# Patient Record
Sex: FEMALE | Race: BLACK OR AFRICAN AMERICAN | NOT HISPANIC OR LATINO | Employment: FULL TIME | URBAN - METROPOLITAN AREA
[De-identification: names, ages, dates, MRNs, and addresses within clinical notes are randomized per-mention and may not be internally consistent; named-entity substitution may affect disease eponyms.]

---

## 2022-12-14 ENCOUNTER — OFFICE VISIT (OUTPATIENT)
Dept: DERMATOLOGY | Facility: CLINIC | Age: 27
End: 2022-12-14
Payer: COMMERCIAL

## 2022-12-14 DIAGNOSIS — L21.9 SEBORRHEIC DERMATITIS: Primary | ICD-10-CM

## 2022-12-14 PROCEDURE — 99999 PR PBB SHADOW E&M-NEW PATIENT-LVL III: CPT | Mod: PBBFAC,,, | Performed by: DERMATOLOGY

## 2022-12-14 PROCEDURE — 99204 OFFICE O/P NEW MOD 45 MIN: CPT | Mod: S$GLB,,, | Performed by: DERMATOLOGY

## 2022-12-14 PROCEDURE — 99204 PR OFFICE/OUTPT VISIT, NEW, LEVL IV, 45-59 MIN: ICD-10-PCS | Mod: S$GLB,,, | Performed by: DERMATOLOGY

## 2022-12-14 PROCEDURE — 99999 PR PBB SHADOW E&M-NEW PATIENT-LVL III: ICD-10-PCS | Mod: PBBFAC,,, | Performed by: DERMATOLOGY

## 2022-12-14 PROCEDURE — 1160F PR REVIEW ALL MEDS BY PRESCRIBER/CLIN PHARMACIST DOCUMENTED: ICD-10-PCS | Mod: CPTII,S$GLB,, | Performed by: DERMATOLOGY

## 2022-12-14 PROCEDURE — 1160F RVW MEDS BY RX/DR IN RCRD: CPT | Mod: CPTII,S$GLB,, | Performed by: DERMATOLOGY

## 2022-12-14 PROCEDURE — 1159F PR MEDICATION LIST DOCUMENTED IN MEDICAL RECORD: ICD-10-PCS | Mod: CPTII,S$GLB,, | Performed by: DERMATOLOGY

## 2022-12-14 PROCEDURE — 1159F MED LIST DOCD IN RCRD: CPT | Mod: CPTII,S$GLB,, | Performed by: DERMATOLOGY

## 2022-12-14 RX ORDER — KETOCONAZOLE 20 MG/G
CREAM TOPICAL
Qty: 60 G | Refills: 3 | Status: SHIPPED | OUTPATIENT
Start: 2022-12-14

## 2022-12-14 RX ORDER — FLUOCINOLONE ACETONIDE 0.11 MG/ML
OIL TOPICAL
Qty: 118 ML | Refills: 5 | Status: SHIPPED | OUTPATIENT
Start: 2022-12-14 | End: 2023-11-08 | Stop reason: SDUPTHER

## 2022-12-14 RX ORDER — METFORMIN HYDROCHLORIDE 500 MG/1
500 TABLET ORAL
COMMUNITY

## 2022-12-14 RX ORDER — TRIAMCINOLONE ACETONIDE 0.25 MG/G
CREAM TOPICAL 2 TIMES DAILY PRN
Qty: 80 G | Refills: 1 | Status: SHIPPED | OUTPATIENT
Start: 2022-12-14

## 2022-12-14 RX ORDER — KETOCONAZOLE 20 MG/ML
SHAMPOO, SUSPENSION TOPICAL
Qty: 120 ML | Refills: 5 | Status: SHIPPED | OUTPATIENT
Start: 2022-12-14 | End: 2023-11-08 | Stop reason: SDUPTHER

## 2022-12-14 NOTE — PROGRESS NOTES
Subjective:       Patient ID:  Main Aguilar is a 27 y.o. female who presents for   Chief Complaint   Patient presents with    Dermatitis     History of Present Illness: The patient presents with chief complaint of dermatitis.  Location: scalp  Duration: 1 year  Signs/Symptoms: irritation, flaking, scabs, burning    Prior treatments: n/a     Review of Systems   Constitutional:  Negative for fever and chills.   Gastrointestinal:  Negative for nausea and vomiting.   Skin:  Positive for activity-related sunscreen use. Negative for daily sunscreen use and recent sunburn.   Hematologic/Lymphatic: Does not bruise/bleed easily.      Objective:    Physical Exam   Constitutional: She appears well-developed and well-nourished. No distress.   Neurological: She is alert and oriented to person, place, and time. She is not disoriented.   Psychiatric: She has a normal mood and affect.   Skin:   Areas Examined (abnormalities noted in diagram):   Scalp / Hair Palpated and Inspected  Head / Face Inspection Performed  Neck Inspection Performed  RUE Inspected  LUE Inspection Performed  Nails and Digits Inspection Performed            Assessment / Plan:        Seborrheic dermatitis  -     ketoconazole (NIZORAL) 2 % shampoo; Wash hair with medicated shampoo at least 1x/week - let sit on scalp at least 5 minutes prior to rinsing  Dispense: 120 mL; Refill: 5  -     fluocinolone (DERMA-SMOOTHE) 0.01 % external oil; Apply oil to scalp once to twice a day as needed for dryness  Dispense: 118 mL; Refill: 5  -     ketoconazole (NIZORAL) 2 % cream; AAA bid  Dispense: 60 g; Refill: 3  -     triamcinolone acetonide 0.025% (KENALOG) 0.025 % cream; Apply topically 2 (two) times daily as needed. Mild steroid. Use sparingly for 1-2 weeks if needed then stop.  Dispense: 80 g; Refill: 1  -     Discussed diagnosis. Will start ketoconazole cream BID for maintenance with TAC 0.025% cream BID prn flares for seb derm of face, will add ketoconazole  shampoo 1 times/week, fluocinolone oil once daily for seb derm of scalp.             Follow up in about 3 months (around 3/14/2023).

## 2022-12-14 NOTE — PATIENT INSTRUCTIONS
Instructions for seborrheic dermatitis:    Use ketoconazole cream twice a day every day to the affected areas for maintenance treatment  For flares (scaliness, redness, itching), use triamcinolone 0.025% cream (steroid cream) twice a day to the affected areas. Mix ketoconazole cream with triamcinolone 0.025% cream and use twice a day as needed for flares only.  Triamcinolone 0.025% cream is a mild steroid and should not be used for periods longer than 2-4 weeks at a time.  Use ketoconazole shampoo 1 times/week on scalp, lather, let sit 5-10 minutes, then rinse.   Use fluocinolone scalp oil 1-2 times/day as a scalp moisturizer.  Can wear overnight with a shower cap and rinse with ketoconazole shampoo in the morning OR apply to damp scalp after shampooing hair with ketoconazole shampoo as a more intense treatment once a week. Do not use on face.

## 2023-08-08 ENCOUNTER — OFFICE VISIT (OUTPATIENT)
Dept: INTERNAL MEDICINE | Facility: CLINIC | Age: 28
End: 2023-08-08
Payer: COMMERCIAL

## 2023-08-08 VITALS
DIASTOLIC BLOOD PRESSURE: 78 MMHG | BODY MASS INDEX: 40.96 KG/M2 | HEART RATE: 79 BPM | SYSTOLIC BLOOD PRESSURE: 114 MMHG | HEIGHT: 61 IN | TEMPERATURE: 97 F | OXYGEN SATURATION: 99 % | WEIGHT: 216.94 LBS

## 2023-08-08 DIAGNOSIS — K59.09 CHRONIC CONSTIPATION: ICD-10-CM

## 2023-08-08 DIAGNOSIS — G89.29 CHRONIC PAIN OF RIGHT WRIST: ICD-10-CM

## 2023-08-08 DIAGNOSIS — M54.89 INTERSCAPULAR PAIN: Primary | ICD-10-CM

## 2023-08-08 DIAGNOSIS — M25.531 CHRONIC PAIN OF RIGHT WRIST: ICD-10-CM

## 2023-08-08 PROBLEM — L30.1 DYSHIDROTIC ECZEMA: Status: ACTIVE | Noted: 2022-11-01

## 2023-08-08 PROCEDURE — 3008F BODY MASS INDEX DOCD: CPT | Mod: CPTII,S$GLB,, | Performed by: FAMILY MEDICINE

## 2023-08-08 PROCEDURE — 1160F RVW MEDS BY RX/DR IN RCRD: CPT | Mod: CPTII,S$GLB,, | Performed by: FAMILY MEDICINE

## 2023-08-08 PROCEDURE — 99999 PR PBB SHADOW E&M-EST. PATIENT-LVL V: CPT | Mod: PBBFAC,,,

## 2023-08-08 PROCEDURE — 1160F PR REVIEW ALL MEDS BY PRESCRIBER/CLIN PHARMACIST DOCUMENTED: ICD-10-PCS | Mod: CPTII,S$GLB,, | Performed by: FAMILY MEDICINE

## 2023-08-08 PROCEDURE — 3078F DIAST BP <80 MM HG: CPT | Mod: CPTII,S$GLB,, | Performed by: FAMILY MEDICINE

## 2023-08-08 PROCEDURE — 3074F PR MOST RECENT SYSTOLIC BLOOD PRESSURE < 130 MM HG: ICD-10-PCS | Mod: CPTII,S$GLB,, | Performed by: FAMILY MEDICINE

## 2023-08-08 PROCEDURE — 99204 PR OFFICE/OUTPT VISIT, NEW, LEVL IV, 45-59 MIN: ICD-10-PCS | Mod: S$GLB,,, | Performed by: FAMILY MEDICINE

## 2023-08-08 PROCEDURE — 3074F SYST BP LT 130 MM HG: CPT | Mod: CPTII,S$GLB,, | Performed by: FAMILY MEDICINE

## 2023-08-08 PROCEDURE — 1159F MED LIST DOCD IN RCRD: CPT | Mod: CPTII,S$GLB,, | Performed by: FAMILY MEDICINE

## 2023-08-08 PROCEDURE — 99204 OFFICE O/P NEW MOD 45 MIN: CPT | Mod: S$GLB,,, | Performed by: FAMILY MEDICINE

## 2023-08-08 PROCEDURE — 3078F PR MOST RECENT DIASTOLIC BLOOD PRESSURE < 80 MM HG: ICD-10-PCS | Mod: CPTII,S$GLB,, | Performed by: FAMILY MEDICINE

## 2023-08-08 PROCEDURE — 99999 PR PBB SHADOW E&M-EST. PATIENT-LVL V: ICD-10-PCS | Mod: PBBFAC,,,

## 2023-08-08 PROCEDURE — 3008F PR BODY MASS INDEX (BMI) DOCUMENTED: ICD-10-PCS | Mod: CPTII,S$GLB,, | Performed by: FAMILY MEDICINE

## 2023-08-08 PROCEDURE — 1159F PR MEDICATION LIST DOCUMENTED IN MEDICAL RECORD: ICD-10-PCS | Mod: CPTII,S$GLB,, | Performed by: FAMILY MEDICINE

## 2023-08-08 RX ORDER — NABUMETONE 500 MG/1
500 TABLET, FILM COATED ORAL 2 TIMES DAILY PRN
COMMUNITY
Start: 2023-03-30 | End: 2023-08-08 | Stop reason: SDUPTHER

## 2023-08-08 RX ORDER — CYCLOBENZAPRINE HCL 10 MG
10 TABLET ORAL EVERY 8 HOURS PRN
COMMUNITY
Start: 2023-03-30 | End: 2023-11-08

## 2023-08-08 RX ORDER — NABUMETONE 500 MG/1
500 TABLET, FILM COATED ORAL 2 TIMES DAILY PRN
Qty: 30 TABLET | Refills: 0 | Status: SHIPPED | OUTPATIENT
Start: 2023-08-08 | End: 2023-11-08

## 2023-08-08 NOTE — ASSESSMENT & PLAN NOTE
I believe this is a tendinitis  Two ibuprofen 4 times a day for 10 days  Ice 3 or 4 times a day  Wear elastic knee brace  Rest any further weaken and gradually start increasing exercise  If no improvement in a week or 2 consider outpatient physical therapy  Note, the tibia has some cyst in the shaft  You need to consult with an orthopedic surgeon about the cystic area in the tibia  Will refer to gi for evaluation

## 2023-08-08 NOTE — PROGRESS NOTES
Subjective     Patient ID: Main Aguilar is a 27 y.o. female.    Chief Complaint: Establish Care and Back Pain    HPI  Patient presents today to establish care with c/o upper back pain and right wrist pain. Patient reports injury to upper back in March after using the rowing machine. Patient was treated with nsaid and muscle relaxant and symptoms improved.  Patient reports sharp pain spontaneously returning on the medial aspect of right shoulder blade without injury. Patient states right wrist pain started with over extension of wrist.  Now having pain sporadically along with numbness in knuckle area which is worse at night. 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 Does report taking 5 to 15 min breaks every hour to stand or walk including a lunch break.  Patient is also concerned about her constipation. Has always been issue. Saw some improvement when started on metformin but is getting worse.    Review of Systems   Constitutional: Negative.    HENT: Negative.     Eyes:  Negative for discharge and redness.   Respiratory: Negative.     Cardiovascular: Negative.  Negative for chest pain, palpitations and leg swelling.   Gastrointestinal:  Positive for constipation. Negative for diarrhea.   Musculoskeletal:  Positive for arthralgias and back pain. Negative for gait problem.   Neurological: Negative.  Negative for coordination difficulties and coordination difficulties.   Psychiatric/Behavioral: Negative.            Objective     Physical Exam  Vitals and nursing note reviewed.   Constitutional:       Appearance: Normal appearance. She is normal weight.   HENT:      Head: Normocephalic and atraumatic.      Nose: Nose normal.      Mouth/Throat:      Mouth: Mucous membranes are moist.      Pharynx: Oropharynx is clear.   Eyes:      Extraocular Movements: Extraocular movements intact.      Conjunctiva/sclera: Conjunctivae normal.      Pupils: Pupils are equal, round, and reactive to light.    Cardiovascular:      Rate and Rhythm: Normal rate and regular rhythm.      Pulses: Normal pulses.      Heart sounds: Normal heart sounds.   Pulmonary:      Effort: Pulmonary effort is normal.      Breath sounds: Normal breath sounds.   Abdominal:      General: Abdomen is flat. Bowel sounds are normal.      Palpations: Abdomen is soft.   Musculoskeletal:      Right wrist: No swelling or deformity. Decreased range of motion.      Cervical back: Normal range of motion and neck supple. No tenderness.      Thoracic back: No tenderness.      Lumbar back: No tenderness.        Back:       Comments: Tender spot   Skin:     General: Skin is warm and dry.   Neurological:      General: No focal deficit present.      Mental Status: She is alert and oriented to person, place, and time.   Psychiatric:         Mood and Affect: Mood normal.         Behavior: Behavior normal.          Assessment and Plan     1. Interscapular pain  Assessment & Plan:  Has failed conservative therapy will refer to ortho for further evaluation    Orders:  -     nabumetone (RELAFEN) 500 MG tablet; Take 1 tablet (500 mg total) by mouth 2 (two) times daily as needed for Pain.  Dispense: 30 tablet; Refill: 0  -     Ambulatory referral/consult to Orthopedics; Future; Expected date: 08/15/2023    2. Chronic pain of right wrist  Assessment & Plan:  Has failed conservative therapy will refer to ortho for further evaluation      Orders:  -     nabumetone (RELAFEN) 500 MG tablet; Take 1 tablet (500 mg total) by mouth 2 (two) times daily as needed for Pain.  Dispense: 30 tablet; Refill: 0  -     Ambulatory referral/consult to Orthopedics; Future; Expected date: 08/15/2023    3. Chronic constipation  Assessment & Plan:  Will refer to gi for evaluation    Orders:  -     Ambulatory referral/consult to Gastroenterology; Future; Expected date: 08/15/2023              No follow-ups on file.

## 2023-08-11 DIAGNOSIS — M25.531 RIGHT WRIST PAIN: Primary | ICD-10-CM

## 2023-08-15 ENCOUNTER — OFFICE VISIT (OUTPATIENT)
Dept: ORTHOPEDICS | Facility: CLINIC | Age: 28
End: 2023-08-15
Payer: COMMERCIAL

## 2023-08-15 ENCOUNTER — HOSPITAL ENCOUNTER (OUTPATIENT)
Dept: RADIOLOGY | Facility: HOSPITAL | Age: 28
Discharge: HOME | End: 2023-08-15
Attending: ORTHOPAEDIC SURGERY
Payer: COMMERCIAL

## 2023-08-15 VITALS — BODY MASS INDEX: 40.78 KG/M2 | HEIGHT: 61 IN | WEIGHT: 216 LBS

## 2023-08-15 DIAGNOSIS — Z01.818 PREOP TESTING: ICD-10-CM

## 2023-08-15 DIAGNOSIS — M67.431 GANGLION OF RIGHT WRIST: Primary | ICD-10-CM

## 2023-08-15 DIAGNOSIS — M25.531 RIGHT WRIST PAIN: ICD-10-CM

## 2023-08-15 DIAGNOSIS — M54.89 INTERSCAPULAR PAIN: ICD-10-CM

## 2023-08-15 PROCEDURE — 99204 OFFICE O/P NEW MOD 45 MIN: CPT | Mod: S$GLB,,, | Performed by: ORTHOPAEDIC SURGERY

## 2023-08-15 PROCEDURE — 99999 PR PBB SHADOW E&M-EST. PATIENT-LVL III: ICD-10-PCS | Mod: PBBFAC,,, | Performed by: ORTHOPAEDIC SURGERY

## 2023-08-15 PROCEDURE — 73110 X-RAY EXAM OF WRIST: CPT | Mod: TC,RT

## 2023-08-15 PROCEDURE — 99999 PR PBB SHADOW E&M-EST. PATIENT-LVL III: CPT | Mod: PBBFAC,,, | Performed by: ORTHOPAEDIC SURGERY

## 2023-08-15 PROCEDURE — 1160F PR REVIEW ALL MEDS BY PRESCRIBER/CLIN PHARMACIST DOCUMENTED: ICD-10-PCS | Mod: CPTII,S$GLB,, | Performed by: ORTHOPAEDIC SURGERY

## 2023-08-15 PROCEDURE — 1160F RVW MEDS BY RX/DR IN RCRD: CPT | Mod: CPTII,S$GLB,, | Performed by: ORTHOPAEDIC SURGERY

## 2023-08-15 PROCEDURE — 1159F PR MEDICATION LIST DOCUMENTED IN MEDICAL RECORD: ICD-10-PCS | Mod: CPTII,S$GLB,, | Performed by: ORTHOPAEDIC SURGERY

## 2023-08-15 PROCEDURE — 3008F BODY MASS INDEX DOCD: CPT | Mod: CPTII,S$GLB,, | Performed by: ORTHOPAEDIC SURGERY

## 2023-08-15 PROCEDURE — 99204 PR OFFICE/OUTPT VISIT, NEW, LEVL IV, 45-59 MIN: ICD-10-PCS | Mod: S$GLB,,, | Performed by: ORTHOPAEDIC SURGERY

## 2023-08-15 PROCEDURE — 1159F MED LIST DOCD IN RCRD: CPT | Mod: CPTII,S$GLB,, | Performed by: ORTHOPAEDIC SURGERY

## 2023-08-15 PROCEDURE — 3008F PR BODY MASS INDEX (BMI) DOCUMENTED: ICD-10-PCS | Mod: CPTII,S$GLB,, | Performed by: ORTHOPAEDIC SURGERY

## 2023-08-15 PROCEDURE — 73110 X-RAY EXAM OF WRIST: CPT | Mod: 26,RT,, | Performed by: RADIOLOGY

## 2023-08-15 PROCEDURE — 73110 XR WRIST COMPLETE 3 VIEWS RIGHT: ICD-10-PCS | Mod: 26,RT,, | Performed by: RADIOLOGY

## 2023-08-15 NOTE — H&P (VIEW-ONLY)
Subjective:     Patient ID: Main Aguilar is a 28 y.o. female.    Chief Complaint: Pain and Swelling of the Right Wrist      HPI:  The patient is a 28-year-old female with a right wrist dorsal ganglion cyst that she is noticed over the last several weeks.  She has pain at the extremes of extension.  The mass waxes and wanes in size.  She wishes to have a right wrist dorsal ganglion cyst excision    Past Medical History:   Diagnosis Date    Nontoxic single thyroid nodule     PCOS (polycystic ovarian syndrome)     Prediabetes     Seborrheic dermatitis      No past surgical history on file.  Family History   Problem Relation Age of Onset    Cancer Mother         Leukemia    Miscarriages / Stillbirths Mother     Cancer Maternal Grandmother         Colon    Cancer Paternal Grandmother         Breast     Social History     Socioeconomic History    Marital status:    Tobacco Use    Smoking status: Never     Passive exposure: Never    Smokeless tobacco: Never   Substance and Sexual Activity    Alcohol use: Yes     Alcohol/week: 1.0 standard drink of alcohol     Types: 1 Drinks containing 0.5 oz of alcohol per week     Comment: Maybe once every 3 - 4 months    Drug use: Not Currently     Types: Marijuana     Comment: Tried it over 4 years ago    Sexual activity: Yes     Partners: Male     Birth control/protection: None     Medication List with Changes/Refills   Current Medications    CYCLOBENZAPRINE (FLEXERIL) 10 MG TABLET    Take 10 mg by mouth every 8 (eight) hours as needed.    FLUOCINOLONE (DERMA-SMOOTHE) 0.01 % EXTERNAL OIL    Apply oil to scalp once to twice a day as needed for dryness    KETOCONAZOLE (NIZORAL) 2 % CREAM    AAA bid    KETOCONAZOLE (NIZORAL) 2 % SHAMPOO    Wash hair with medicated shampoo at least 1x/week - let sit on scalp at least 5 minutes prior to rinsing    METFORMIN (GLUCOPHAGE) 500 MG TABLET    Take 500 mg by mouth 2 (two) times daily with meals.    NABUMETONE (RELAFEN) 500 MG TABLET     Take 1 tablet (500 mg total) by mouth 2 (two) times daily as needed for Pain.    TRIAMCINOLONE ACETONIDE 0.025% (KENALOG) 0.025 % CREAM    Apply topically 2 (two) times daily as needed. Mild steroid. Use sparingly for 1-2 weeks if needed then stop.     Review of patient's allergies indicates:   Allergen Reactions    Lactase Rash     Review of Systems   Constitutional: Negative for malaise/fatigue.   HENT:  Negative for hearing loss.    Eyes:  Negative for double vision and visual disturbance.   Cardiovascular:  Negative for chest pain.   Respiratory:  Negative for shortness of breath.    Endocrine: Negative for cold intolerance.   Hematologic/Lymphatic: Does not bruise/bleed easily.   Skin:  Negative for poor wound healing and suspicious lesions.   Musculoskeletal:  Negative for gout, joint pain and joint swelling.   Gastrointestinal:  Positive for constipation. Negative for nausea and vomiting.   Genitourinary:  Negative for dysuria.   Neurological:  Negative for numbness, paresthesias and sensory change.   Psychiatric/Behavioral:  Negative for depression, memory loss and substance abuse. The patient is not nervous/anxious.    Allergic/Immunologic: Negative for persistent infections.       Objective:   Body mass index is 40.81 kg/m².  There were no vitals filed for this visit.             General    Constitutional: She is oriented to person, place, and time. She appears well-developed and well-nourished. No distress.   HENT:   Head: Normocephalic.   Mouth/Throat: Oropharynx is clear and moist.   Eyes: EOM are normal.   Cardiovascular:  Normal rate.            Pulmonary/Chest: Effort normal.   Abdominal: Soft.   Neurological: She is alert and oriented to person, place, and time. No cranial nerve deficit.   Psychiatric: She has a normal mood and affect.             Right Hand/Wrist Exam     Inspection   Scars: Wrist - absent Hand -  absent  Effusion: Wrist - absent Hand -  absent    Pain   Wrist - The patient  exhibits pain of the scapholunate/lunate ECU.    Other     Neuorologic Exam    Median Distribution: normal  Ulnar Distribution: normal  Radial Distribution: normal    Comments:  The patient has a 1 cm right wrist dorsal ganglion cyst with pain at the extremes of extension.          Vascular Exam       Capillary Refill  Right Hand: normal capillary refill          Relevant imaging results reviewed and interpreted by me, discussed with the patient and / or family today radiographs right wrist were normal  Assessment:     Right wrist dorsal ganglion cyst     Plan:       The patient wishes to have excision ganglion cyst dorsal right wrist.  She was counseled regarding the surgery.  Risk complications and alternatives were discussed including the risk of infection, anesthetic risk, injury to nerves and vessels, loss of motion, and possible need for additional surgeries were discussed.  A 5% recurrence rate was quoted.  She seems to understand and agree to that surgery.  All questions were answered.              Disclaimer: This note was prepared using a voice recognition system and is likely to have sound alike errors within the text.

## 2023-08-18 DIAGNOSIS — Z01.818 PRE-OP TESTING: Primary | ICD-10-CM

## 2023-08-18 RX ORDER — PHENTERMINE HYDROCHLORIDE 37.5 MG/1
1 CAPSULE ORAL
COMMUNITY
End: 2023-08-28

## 2023-08-21 ENCOUNTER — PATIENT MESSAGE (OUTPATIENT)
Dept: RESEARCH | Facility: HOSPITAL | Age: 28
End: 2023-08-21

## 2023-08-24 PROBLEM — Z01.818 PRE-OP EXAM: Status: ACTIVE | Noted: 2023-08-24

## 2023-08-24 PROBLEM — E28.2 PCOS (POLYCYSTIC OVARIAN SYNDROME): Status: ACTIVE | Noted: 2023-08-24

## 2023-08-24 PROBLEM — M67.40 GANGLION CYST: Status: ACTIVE | Noted: 2023-08-24

## 2023-08-24 PROBLEM — R73.03 PRE-DIABETES: Status: ACTIVE | Noted: 2023-08-24

## 2023-08-24 NOTE — PROGRESS NOTES
Preoperative History and Physical  [facility]                                                                   Chief Complaint: Preoperative evaluation     History of Present Illness:      Main Aguliar is a 28 y.o. female who presents to the office today for a preoperative consultation at the request of Dr. Camarena who plans on performing right wrist ganglion cyst excision on September 11.     Functional Status:      The patient is able to climb a flight of stairs. The patient is able to ambulate  without difficulty. The patient's functional status is not affected by the surgical problem. The patient's functional status is not affected by shortness of breath, chest pain, dyspnea on exertion and fatigue.  Active gym 4 x weekly no CP.   MET score greater than 4    Patient Anesthesia History:      No prior anesthesia   Family Anesthesia History:      History of Malignant Hyperthermia: no  History of Pseudocholinesterase Deficiency: no     Past Medical History:      Past Medical History:   Diagnosis Date    Asthma     Nontoxic single thyroid nodule     PCOS (polycystic ovarian syndrome)     Prediabetes     Seborrheic dermatitis         Past Surgical History:      No past surgical history on file.     Social History:      Social History     Socioeconomic History    Marital status:    Tobacco Use    Smoking status: Never     Passive exposure: Never    Smokeless tobacco: Never   Substance and Sexual Activity    Alcohol use: Yes     Alcohol/week: 1.0 standard drink of alcohol     Types: 1 Drinks containing 0.5 oz of alcohol per week     Comment: Maybe once every 3 - 4 months    Drug use: Not Currently     Types: Marijuana     Comment: Tried it over 4 years ago    Sexual activity: Yes     Partners: Male     Birth control/protection: None        Family History:      Family History   Problem Relation Age of Onset    Cancer Mother         Leukemia    Miscarriages  / Stillbirths Mother     Hypertension Father     Cancer Maternal Grandmother         Colon    Cancer Paternal Grandmother         Breast       Allergies:      Review of patient's allergies indicates:   Allergen Reactions    Lactase Rash       Medications:      Current Outpatient Medications   Medication Sig    fluocinolone (DERMA-SMOOTHE) 0.01 % external oil Apply oil to scalp once to twice a day as needed for dryness    ketoconazole (NIZORAL) 2 % cream AAA bid    ketoconazole (NIZORAL) 2 % shampoo Wash hair with medicated shampoo at least 1x/week - let sit on scalp at least 5 minutes prior to rinsing    metFORMIN (GLUCOPHAGE) 500 MG tablet Take 500 mg by mouth 2 (two) times daily with meals.    nabumetone (RELAFEN) 500 MG tablet Take 1 tablet (500 mg total) by mouth 2 (two) times daily as needed for Pain.    triamcinolone acetonide 0.025% (KENALOG) 0.025 % cream Apply topically 2 (two) times daily as needed. Mild steroid. Use sparingly for 1-2 weeks if needed then stop.    cyclobenzaprine (FLEXERIL) 10 MG tablet Take 10 mg by mouth every 8 (eight) hours as needed.     No current facility-administered medications for this visit.       Vitals:      Vitals:    08/28/23 0811   BP: 120/70   Pulse: 84   Resp: 14   Temp: 97.8 °F (36.6 °C)       Review of Systems:        Constitutional: Negative for fever, chills, weight loss, and diaphoresis.   HENT: Negative for hearing loss, ear pain, nosebleeds, congestion, sore throat, tinnitus and ear discharge.   neck pain  Eyes: Negative for blurred vision, double vision, photophobia, pain, discharge and redness.   Respiratory: Negative for cough, hemoptysis, sputum production, wheezing and stridor.    Cardiovascular: Negative for chest pain, palpitations, orthopnea, claudication, leg swelling and PND.   Gastrointestinal: Negative for heartburn, nausea, , abdominal pain, diarrhea, constipation, blood in stool and melena. Occ vomiting   Genitourinary: Negative for dysuria, urgency,  frequency, hematuria and flank pain.   Musculoskeletal: Negative for myalgias, , joint pain and falls. back pain- R shoulder blade   Skin: eczema L antecubital fossa , seborrhoic dermatitis    Neurological: Negative for dizziness, , tremors, sensory change, speech change, focal weakness, seizures, loss of consciousness, weakness  Tingling R hand; headaches   Endo/Heme/Allergies: Negative for environmental allergies and polydipsia  Psychiatric/Behavioral: Negative for depression.     Physical Exam:      Constitutional: Appears well-developed, well-nourished and in no acute distress.  Patient is oriented to person, place, and time.   Head: Normocephalic and atraumatic. Mucous membranes moist.  Neck: Neck supple no mass.   Cardiovascular: Normal rate and regular rhythm.  S1 S2 appreciated by ascultation.  Pulmonary/Chest: Effort normal and clear to auscultation bilaterally. No respiratory distress.   Abdomen: Soft. Non-tender and non-distended. Bowel sounds are normal.   Neurological: Patient is alert and oriented to person, place and time. Moves all extremities.  Skin: Warm and dry. No lesions.  Extremities: No clubbing, cyanosis or edema.    Laboratory data:      Reviewed and noted in plan where applicable. Please see chart for full laboratory data.    Labs pending   Predictors of intubation difficulty:       Morbid obesity? BMI 40.8   Anatomically abnormal facies? no   Prominent incisors? no   Receding mandible? no   Short, thick neck? no   Neck range of motion: normal   Dentition:  intact  Based on the Modified Mallampati, patient is a mallampati score: I (soft palate, uvula, fauces, and tonsillar pillars visible)    Cardiographics:      ECG:  none  Echocardiogram:  none    Imaging:      Chest x-ray:  none      Assessment and Plan:      Pre-op exam  Patient presents at the request of Dr. Camarena who plans on performing a ganglion cyst excision to right wrist on September 11th  - Known risk factors for perioperative  complications: pre DM, BMI 40    Difficulty with intubation is not anticipated.  No airway history available for review    Cardiac Risk Estimation:  Per Revised Cardiac Risk Index patient is a Class I  risk with a 3.9% risk of a major cardiac event.      1.) Preoperative workup as follows: hemoglobin, hematocrit, electrolytes, creatinine, glucose, liver function studies.  2.) Change in medication regimen before surgery: Hold NSAIDs 7 days preop, hold metformin 24 hours prior to surgery.  3.) Prophylaxis for cardiac events with perioperative beta-blockers: not indicated.  4.) Invasive hemodynamic monitoring perioperatively: not indicated.  5.) Deep vein thrombosis prophylaxis postoperatively: intermittent pneumatic compression boots and regimen to be chosen by surgical team.  6.) Surveillance for postoperative MI with ECG immediately postoperatively and on postoperati ve days 1 and 2 AND troponin levels 24 hours postoperatively and on day 4 or hospital discharge (whichever comes first): not indicated.  7.) Current medications which may produce withdrawal symptoms if withheld perioperatively: none  8.) Other measures: none      Ganglion cyst  Plans for surgical excision on September 11th  - R wrist pain     Pre-diabetes  A1c pending  - hold metformin 24 hours prior to surgery    BMI 40.0-44.9, adult  - patient with BMI of 40.81  - patient on phentermine needs to hold 2 weeks preoperatively      PCOS (polycystic ovarian syndrome)  - on metformin  - heavy cycles  - keep follow up with GYN     Thyroid nodule  - previously seen by endocrine   - no dysphagia  - can feel thyroid nodule  with voice projection  - per care everywhere : R nod 2.4cm  , US showed enlarged thyroid nodule to R    FNA completed 2/10/2023 - pathology benign    Repeat US in one year -2/2024.

## 2023-08-24 NOTE — ASSESSMENT & PLAN NOTE
Patient presents at the request of Dr. Camarena who plans on performing a ganglion cyst excision to right wrist on September 11th  - Known risk factors for perioperative complications: pre DM, BMI 40    Difficulty with intubation is not anticipated.  No airway history available for review    Cardiac Risk Estimation:  Per Revised Cardiac Risk Index patient is a Class I  risk with a 3.9% risk of a major cardiac event.      1.) Preoperative workup as follows: hemoglobin, hematocrit, electrolytes, creatinine, glucose, liver function studies.  2.) Change in medication regimen before surgery: Hold NSAIDs 7 days preop, hold metformin 24 hours prior to surgery.  3.) Prophylaxis for cardiac events with perioperative beta-blockers: not indicated.  4.) Invasive hemodynamic monitoring perioperatively: not indicated.  5.) Deep vein thrombosis prophylaxis postoperatively: intermittent pneumatic compression boots and regimen to be chosen by surgical team.  6.) Surveillance for postoperative MI with ECG immediately postoperatively and on postoperati ve days 1 and 2 AND troponin levels 24 hours postoperatively and on day 4 or hospital discharge (whichever comes first): not indicated.  7.) Current medications which may produce withdrawal symptoms if withheld perioperatively: none  8.) Other measures: none

## 2023-08-28 ENCOUNTER — LAB VISIT (OUTPATIENT)
Dept: LAB | Facility: HOSPITAL | Age: 28
End: 2023-08-28
Attending: ORTHOPAEDIC SURGERY
Payer: COMMERCIAL

## 2023-08-28 ENCOUNTER — PATIENT MESSAGE (OUTPATIENT)
Dept: PREADMISSION TESTING | Facility: HOSPITAL | Age: 28
End: 2023-08-28

## 2023-08-28 ENCOUNTER — OFFICE VISIT (OUTPATIENT)
Dept: INTERNAL MEDICINE | Facility: CLINIC | Age: 28
End: 2023-08-28
Payer: COMMERCIAL

## 2023-08-28 VITALS
DIASTOLIC BLOOD PRESSURE: 70 MMHG | SYSTOLIC BLOOD PRESSURE: 120 MMHG | TEMPERATURE: 98 F | RESPIRATION RATE: 14 BRPM | HEART RATE: 84 BPM

## 2023-08-28 DIAGNOSIS — Z01.818 PRE-OP EXAM: Primary | ICD-10-CM

## 2023-08-28 DIAGNOSIS — Z01.818 PREOP TESTING: ICD-10-CM

## 2023-08-28 DIAGNOSIS — Z01.818 PRE-OP TESTING: ICD-10-CM

## 2023-08-28 DIAGNOSIS — Z01.818 PRE-OP EXAM: ICD-10-CM

## 2023-08-28 DIAGNOSIS — M67.40 GANGLION CYST: ICD-10-CM

## 2023-08-28 DIAGNOSIS — E04.1 THYROID NODULE: ICD-10-CM

## 2023-08-28 DIAGNOSIS — E28.2 PCOS (POLYCYSTIC OVARIAN SYNDROME): ICD-10-CM

## 2023-08-28 DIAGNOSIS — R73.03 PRE-DIABETES: ICD-10-CM

## 2023-08-28 LAB
ALBUMIN SERPL BCP-MCNC: 3.5 G/DL (ref 3.5–5.2)
ALP SERPL-CCNC: 79 U/L (ref 55–135)
ALT SERPL W/O P-5'-P-CCNC: 12 U/L (ref 10–44)
ANION GAP SERPL CALC-SCNC: 7 MMOL/L (ref 8–16)
AST SERPL-CCNC: 18 U/L (ref 10–40)
BASOPHILS # BLD AUTO: 0.06 K/UL (ref 0–0.2)
BASOPHILS NFR BLD: 1.1 % (ref 0–1.9)
BILIRUB SERPL-MCNC: 0.4 MG/DL (ref 0.1–1)
BUN SERPL-MCNC: 9 MG/DL (ref 6–20)
CALCIUM SERPL-MCNC: 9.2 MG/DL (ref 8.7–10.5)
CHLORIDE SERPL-SCNC: 110 MMOL/L (ref 95–110)
CO2 SERPL-SCNC: 22 MMOL/L (ref 23–29)
CREAT SERPL-MCNC: 0.8 MG/DL (ref 0.5–1.4)
DIFFERENTIAL METHOD: ABNORMAL
EOSINOPHIL # BLD AUTO: 0.1 K/UL (ref 0–0.5)
EOSINOPHIL NFR BLD: 2.3 % (ref 0–8)
ERYTHROCYTE [DISTWIDTH] IN BLOOD BY AUTOMATED COUNT: 18.2 % (ref 11.5–14.5)
EST. GFR  (NO RACE VARIABLE): >60 ML/MIN/1.73 M^2
ESTIMATED AVG GLUCOSE: 108 MG/DL (ref 68–131)
GLUCOSE SERPL-MCNC: 85 MG/DL (ref 70–110)
HBA1C MFR BLD: 5.4 % (ref 4–5.6)
HCT VFR BLD AUTO: 31.1 % (ref 37–48.5)
HGB BLD-MCNC: 8.8 G/DL (ref 12–16)
IMM GRANULOCYTES # BLD AUTO: 0.01 K/UL (ref 0–0.04)
IMM GRANULOCYTES NFR BLD AUTO: 0.2 % (ref 0–0.5)
LYMPHOCYTES # BLD AUTO: 2.5 K/UL (ref 1–4.8)
LYMPHOCYTES NFR BLD: 43.6 % (ref 18–48)
MCH RBC QN AUTO: 20.5 PG (ref 27–31)
MCHC RBC AUTO-ENTMCNC: 28.3 G/DL (ref 32–36)
MCV RBC AUTO: 72 FL (ref 82–98)
MONOCYTES # BLD AUTO: 0.6 K/UL (ref 0.3–1)
MONOCYTES NFR BLD: 9.9 % (ref 4–15)
NEUTROPHILS # BLD AUTO: 2.4 K/UL (ref 1.8–7.7)
NEUTROPHILS NFR BLD: 42.9 % (ref 38–73)
NRBC BLD-RTO: 0 /100 WBC
PLATELET # BLD AUTO: 648 K/UL (ref 150–450)
PMV BLD AUTO: 9.7 FL (ref 9.2–12.9)
POTASSIUM SERPL-SCNC: 4.4 MMOL/L (ref 3.5–5.1)
PROT SERPL-MCNC: 7.3 G/DL (ref 6–8.4)
RBC # BLD AUTO: 4.3 M/UL (ref 4–5.4)
SODIUM SERPL-SCNC: 139 MMOL/L (ref 136–145)
WBC # BLD AUTO: 5.67 K/UL (ref 3.9–12.7)

## 2023-08-28 PROCEDURE — 99499 UNLISTED E&M SERVICE: CPT | Mod: S$GLB,,, | Performed by: ANESTHESIOLOGY

## 2023-08-28 PROCEDURE — 36415 COLL VENOUS BLD VENIPUNCTURE: CPT | Performed by: ORTHOPAEDIC SURGERY

## 2023-08-28 PROCEDURE — 85025 COMPLETE CBC W/AUTO DIFF WBC: CPT | Performed by: ORTHOPAEDIC SURGERY

## 2023-08-28 PROCEDURE — 80053 COMPREHEN METABOLIC PANEL: CPT | Performed by: ORTHOPAEDIC SURGERY

## 2023-08-28 PROCEDURE — 99999 PR PBB SHADOW E&M-EST. PATIENT-LVL III: CPT | Mod: PBBFAC,,,

## 2023-08-28 PROCEDURE — 99999 PR PBB SHADOW E&M-EST. PATIENT-LVL III: ICD-10-PCS | Mod: PBBFAC,,,

## 2023-08-28 PROCEDURE — 83036 HEMOGLOBIN GLYCOSYLATED A1C: CPT | Performed by: PHYSICIAN ASSISTANT

## 2023-08-28 PROCEDURE — 99499 NO LOS: ICD-10-PCS | Mod: S$GLB,,, | Performed by: ANESTHESIOLOGY

## 2023-08-28 NOTE — DISCHARGE INSTRUCTIONS
To confirm, your doctor has instructed you: Surgery is scheduled for 9/11/2023.     Pre admit office will call the afternoon prior to surgery between 1PM and 3PM with arrival time.    Surgery will be at [facility],  The address is [address]. [place] [address].      IMPORTANT INSTRUCTIONS!    Do not eat or drink after 12 midnight, including water. Do not smoke or use chewing tobacco after 12 midnight  OK to brush teeth, but no gum, candy, or mints!      *Take only these medicines with a small swallow of water-morning of surgery*     none       ____ Stop Aspirin, Ibuprofen, Motrin and Aleve at least 5-7 days before surgery, unless otherwise instructed by your doctor, or the nurse.   You MAY use Tylenol/acetaminophen until day of surgery.      ____  If you take diabetic medication, do NOT take morning of surgery unless instructed by Doctor. Metformin must be stopped 24 hrs prior to surgery time.       ____ Stop taking any Fish Oil supplements or Vitamins at least 5 days prior to surgery, unless instructed otherwise by your Doctor.       Please notify MD office if you have an active infection, currently taking antibiotics or received a vaccination within the past 7 days.    You may be required to provide a urine sample prior to procedure;   Please ask  for a specimen cup if you need to use the restroom prior to being called into pre-op.    Bathing Instructions: The night before surgery and the morning prior to coming to the hospital:    - Shower & rinse your body as usual with anti-bacterial Soap (Dial or Lever 2000)   -Hibiclens (if indicated) use AFTER anti-bacterial soap; 1 packet PM/1 packet in AM on surgical site only   -Do not use hibiclens on your head, face, or genitals.    -Do not wash with anti-bacterial soap after you use the hibiclens.    -Do not shave surgical site 5-7 days prior to surgery.    -Pubic hair 7 days prior to surgery (gyn pt's).      Pediatric patients do not  need to use anti-bacterial soap or Hibiclens.             After Bathing:   __ No powder, lotions, creams, or body spray to skin     __No deodorant for any breast procedure, PORT, or upper arm surgery     __ No makeup, mascara, nail polish or artificial nails       **SURGERY WILL BE CANCELLED IF ARTIFICIAL/NAIL POLISH IS PRESENT!!!**    __ Please remove all piercings and leave all jewelry at home.    **SURGERY WILL BE CANCELLED IF PIERCINGS ARE PRESENT!!!**      __ Dentures, Hearing Aids and Contact Lens need to be removed prior to the start of surgery.      __ Wear clean, loose-fitting clothing. Allow for dressings/bandages/surgical equipment     __ You must have transportation, and they MUST stay the entire time.         [facility] Visitor/Ride Policy:   Only 1 adult allowed (over the age of 18) to accompany you and MUST STAY through the entire length of admission.     -Must have a ride home from a responsible adult that you know and trust.    -Medical Transport, Uber or Lyft can only be used if patient has a responsible adult to accompany them during ride home.  Pediatric patients are encouraged to have 2 adults accompany them to the surgery center.     ~Your ride MUST STAY the entire time until you are discharged~        Post-Op Instructions: You will receive surgery post-op instructions by your Discharge Nurse prior to going home.     Surgical Site Infection:   Prevention of surgical site infections:   -Keep incisions clean and dry.   -Do not soak/submerge incisions in water until completely healed.   -Do not apply lotions, powders, creams, or deodorants to site.   -Always make sure hands are cleaned with antibacterial soap/ alcohol-based  prior to touching the surgical site.       Signs and symptoms:               -Redness and pain around the area where you had surgery               -Drainage of cloudy fluid from your surgical wound               -Fever over 100.4 or chills     >>>Call Surgeon  office/on-call Surgeon if you experience any of these signs & symptoms post-surgery @ [phone].       *Please Call [facility] Pre-Admit Department for surgery instruction questions:  [phone]    *Payment questions:  [phone]    *Billing questions:  [phone]

## 2023-08-28 NOTE — PRE-PROCEDURE INSTRUCTIONS
To confirm, your doctor has instructed you: Surgery is scheduled for 9/11/2023.     Pre admit office will call the afternoon prior to surgery between 1PM and 3PM with arrival time.    Surgery will be at [facility],  The address is [address]. [place] [address].      IMPORTANT INSTRUCTIONS!    Do not eat or drink after 12 midnight, including water. Do not smoke or use chewing tobacco after 12 midnight  OK to brush teeth, but no gum, candy, or mints!      *Take only these medicines with a small swallow of water-morning of surgery*     none       ____ Stop Aspirin, Ibuprofen, Motrin and Aleve at least 5-7 days before surgery, unless otherwise instructed by your doctor, or the nurse.   You MAY use Tylenol/acetaminophen until day of surgery.      ____  If you take diabetic medication, do NOT take morning of surgery unless instructed by Doctor. Metformin must be stopped 24 hrs prior to surgery time.       ____ Stop taking any Fish Oil supplements or Vitamins at least 5 days prior to surgery, unless instructed otherwise by your Doctor.       Please notify MD office if you have an active infection, currently taking antibiotics or received a vaccination within the past 7 days.    You may be required to provide a urine sample prior to procedure;   Please ask  for a specimen cup if you need to use the restroom prior to being called into pre-op.    Bathing Instructions: The night before surgery and the morning prior to coming to the hospital:    - Shower & rinse your body as usual with anti-bacterial Soap (Dial or Lever 2000)   -Hibiclens (if indicated) use AFTER anti-bacterial soap; 1 packet PM/1 packet in AM on surgical site only   -Do not use hibiclens on your head, face, or genitals.    -Do not wash with anti-bacterial soap after you use the hibiclens.    -Do not shave surgical site 5-7 days prior to surgery.    -Pubic hair 7 days prior to surgery (gyn pt's).      Pediatric patients do not  need to use anti-bacterial soap or Hibiclens.             After Bathing:   __ No powder, lotions, creams, or body spray to skin     __No deodorant for any breast procedure, PORT, or upper arm surgery     __ No makeup, mascara, nail polish or artificial nails       **SURGERY WILL BE CANCELLED IF ARTIFICIAL/NAIL POLISH IS PRESENT!!!**    __ Please remove all piercings and leave all jewelry at home.    **SURGERY WILL BE CANCELLED IF PIERCINGS ARE PRESENT!!!**      __ Dentures, Hearing Aids and Contact Lens need to be removed prior to the start of surgery.      __ Wear clean, loose-fitting clothing. Allow for dressings/bandages/surgical equipment     __ You must have transportation, and they MUST stay the entire time.         [facility] Visitor/Ride Policy:   Only 1 adult allowed (over the age of 18) to accompany you and MUST STAY through the entire length of admission.     -Must have a ride home from a responsible adult that you know and trust.    -Medical Transport, Uber or Lyft can only be used if patient has a responsible adult to accompany them during ride home.  Pediatric patients are encouraged to have 2 adults accompany them to the surgery center.     ~Your ride MUST STAY the entire time until you are discharged~        Post-Op Instructions: You will receive surgery post-op instructions by your Discharge Nurse prior to going home.     Surgical Site Infection:   Prevention of surgical site infections:   -Keep incisions clean and dry.   -Do not soak/submerge incisions in water until completely healed.   -Do not apply lotions, powders, creams, or deodorants to site.   -Always make sure hands are cleaned with antibacterial soap/ alcohol-based  prior to touching the surgical site.       Signs and symptoms:               -Redness and pain around the area where you had surgery               -Drainage of cloudy fluid from your surgical wound               -Fever over 100.4 or chills     >>>Call Surgeon  office/on-call Surgeon if you experience any of these signs & symptoms post-surgery @ [phone].       *Please Call [facility] Pre-Admit Department for surgery instruction questions:  [phone]    *Payment questions:  [phone]    *Billing questions:  [phone]

## 2023-08-28 NOTE — ASSESSMENT & PLAN NOTE
- previously seen by endocrine   - no dysphagia  - can feel thyroid nodule  with voice projection  - per care everywhere : R nod 2.4cm  , US showed enlarged thyroid nodule to R    FNA completed 2/10/2023 - pathology benign    Repeat US in one year -2/2024.

## 2023-08-30 ENCOUNTER — TELEPHONE (OUTPATIENT)
Dept: INTERNAL MEDICINE | Facility: CLINIC | Age: 28
End: 2023-08-30

## 2023-09-05 ENCOUNTER — LAB VISIT (OUTPATIENT)
Dept: LAB | Facility: HOSPITAL | Age: 28
End: 2023-09-05
Attending: NURSE PRACTITIONER
Payer: COMMERCIAL

## 2023-09-05 ENCOUNTER — OFFICE VISIT (OUTPATIENT)
Dept: INTERNAL MEDICINE | Facility: CLINIC | Age: 28
End: 2023-09-05
Payer: COMMERCIAL

## 2023-09-05 VITALS
SYSTOLIC BLOOD PRESSURE: 122 MMHG | HEART RATE: 79 BPM | WEIGHT: 216.5 LBS | BODY MASS INDEX: 40.87 KG/M2 | HEIGHT: 61 IN | OXYGEN SATURATION: 100 % | TEMPERATURE: 97 F | DIASTOLIC BLOOD PRESSURE: 70 MMHG

## 2023-09-05 DIAGNOSIS — D50.9 IRON DEFICIENCY ANEMIA, UNSPECIFIED IRON DEFICIENCY ANEMIA TYPE: Primary | ICD-10-CM

## 2023-09-05 DIAGNOSIS — D50.9 IRON DEFICIENCY ANEMIA, UNSPECIFIED IRON DEFICIENCY ANEMIA TYPE: ICD-10-CM

## 2023-09-05 LAB
BASOPHILS # BLD AUTO: 0.04 K/UL (ref 0–0.2)
BASOPHILS NFR BLD: 0.9 % (ref 0–1.9)
DIFFERENTIAL METHOD: ABNORMAL
EOSINOPHIL # BLD AUTO: 0.1 K/UL (ref 0–0.5)
EOSINOPHIL NFR BLD: 2.8 % (ref 0–8)
ERYTHROCYTE [DISTWIDTH] IN BLOOD BY AUTOMATED COUNT: 18 % (ref 11.5–14.5)
FERRITIN SERPL-MCNC: 5 NG/ML (ref 20–300)
HCT VFR BLD AUTO: 32.5 % (ref 37–48.5)
HGB BLD-MCNC: 9 G/DL (ref 12–16)
IMM GRANULOCYTES # BLD AUTO: 0.01 K/UL (ref 0–0.04)
IMM GRANULOCYTES NFR BLD AUTO: 0.2 % (ref 0–0.5)
IRON SERPL-MCNC: 28 UG/DL (ref 30–160)
LYMPHOCYTES # BLD AUTO: 2.6 K/UL (ref 1–4.8)
LYMPHOCYTES NFR BLD: 56.7 % (ref 18–48)
MCH RBC QN AUTO: 20 PG (ref 27–31)
MCHC RBC AUTO-ENTMCNC: 27.7 G/DL (ref 32–36)
MCV RBC AUTO: 72 FL (ref 82–98)
MONOCYTES # BLD AUTO: 0.4 K/UL (ref 0.3–1)
MONOCYTES NFR BLD: 8.2 % (ref 4–15)
NEUTROPHILS # BLD AUTO: 1.5 K/UL (ref 1.8–7.7)
NEUTROPHILS NFR BLD: 31.2 % (ref 38–73)
NRBC BLD-RTO: 0 /100 WBC
PLATELET # BLD AUTO: 647 K/UL (ref 150–450)
PMV BLD AUTO: 9.2 FL (ref 9.2–12.9)
RBC # BLD AUTO: 4.49 M/UL (ref 4–5.4)
SATURATED IRON: 5 % (ref 20–50)
TOTAL IRON BINDING CAPACITY: 570 UG/DL (ref 250–450)
TRANSFERRIN SERPL-MCNC: 385 MG/DL (ref 200–375)
VIT B12 SERPL-MCNC: 428 PG/ML (ref 210–950)
WBC # BLD AUTO: 4.64 K/UL (ref 3.9–12.7)

## 2023-09-05 PROCEDURE — 3008F PR BODY MASS INDEX (BMI) DOCUMENTED: ICD-10-PCS | Mod: CPTII,S$GLB,, | Performed by: NURSE PRACTITIONER

## 2023-09-05 PROCEDURE — 82607 VITAMIN B-12: CPT | Performed by: NURSE PRACTITIONER

## 2023-09-05 PROCEDURE — 84466 ASSAY OF TRANSFERRIN: CPT | Performed by: NURSE PRACTITIONER

## 2023-09-05 PROCEDURE — 3078F DIAST BP <80 MM HG: CPT | Mod: CPTII,S$GLB,, | Performed by: NURSE PRACTITIONER

## 2023-09-05 PROCEDURE — 1160F PR REVIEW ALL MEDS BY PRESCRIBER/CLIN PHARMACIST DOCUMENTED: ICD-10-PCS | Mod: CPTII,S$GLB,, | Performed by: NURSE PRACTITIONER

## 2023-09-05 PROCEDURE — 3044F PR MOST RECENT HEMOGLOBIN A1C LEVEL <7.0%: ICD-10-PCS | Mod: CPTII,S$GLB,, | Performed by: NURSE PRACTITIONER

## 2023-09-05 PROCEDURE — 99214 PR OFFICE/OUTPT VISIT, EST, LEVL IV, 30-39 MIN: ICD-10-PCS | Mod: S$GLB,,, | Performed by: NURSE PRACTITIONER

## 2023-09-05 PROCEDURE — 1159F MED LIST DOCD IN RCRD: CPT | Mod: CPTII,S$GLB,, | Performed by: NURSE PRACTITIONER

## 2023-09-05 PROCEDURE — 3074F SYST BP LT 130 MM HG: CPT | Mod: CPTII,S$GLB,, | Performed by: NURSE PRACTITIONER

## 2023-09-05 PROCEDURE — 3044F HG A1C LEVEL LT 7.0%: CPT | Mod: CPTII,S$GLB,, | Performed by: NURSE PRACTITIONER

## 2023-09-05 PROCEDURE — 1160F RVW MEDS BY RX/DR IN RCRD: CPT | Mod: CPTII,S$GLB,, | Performed by: NURSE PRACTITIONER

## 2023-09-05 PROCEDURE — 1159F PR MEDICATION LIST DOCUMENTED IN MEDICAL RECORD: ICD-10-PCS | Mod: CPTII,S$GLB,, | Performed by: NURSE PRACTITIONER

## 2023-09-05 PROCEDURE — 85025 COMPLETE CBC W/AUTO DIFF WBC: CPT | Performed by: NURSE PRACTITIONER

## 2023-09-05 PROCEDURE — 36415 COLL VENOUS BLD VENIPUNCTURE: CPT | Mod: PO | Performed by: NURSE PRACTITIONER

## 2023-09-05 PROCEDURE — 83540 ASSAY OF IRON: CPT | Performed by: NURSE PRACTITIONER

## 2023-09-05 PROCEDURE — 99214 OFFICE O/P EST MOD 30 MIN: CPT | Mod: S$GLB,,, | Performed by: NURSE PRACTITIONER

## 2023-09-05 PROCEDURE — 3078F PR MOST RECENT DIASTOLIC BLOOD PRESSURE < 80 MM HG: ICD-10-PCS | Mod: CPTII,S$GLB,, | Performed by: NURSE PRACTITIONER

## 2023-09-05 PROCEDURE — 3074F PR MOST RECENT SYSTOLIC BLOOD PRESSURE < 130 MM HG: ICD-10-PCS | Mod: CPTII,S$GLB,, | Performed by: NURSE PRACTITIONER

## 2023-09-05 PROCEDURE — 99999 PR PBB SHADOW E&M-EST. PATIENT-LVL IV: CPT | Mod: PBBFAC,,, | Performed by: NURSE PRACTITIONER

## 2023-09-05 PROCEDURE — 99999 PR PBB SHADOW E&M-EST. PATIENT-LVL IV: ICD-10-PCS | Mod: PBBFAC,,, | Performed by: NURSE PRACTITIONER

## 2023-09-05 PROCEDURE — 3008F BODY MASS INDEX DOCD: CPT | Mod: CPTII,S$GLB,, | Performed by: NURSE PRACTITIONER

## 2023-09-05 PROCEDURE — 82728 ASSAY OF FERRITIN: CPT | Performed by: NURSE PRACTITIONER

## 2023-09-05 NOTE — PROGRESS NOTES
"Subjective:       Patient ID: Main Aguilar is a 28 y.o. female.    Chief Complaint: Labs Only    Pt presents to clinic today for lab follow up  New to me, PCP Dr. Carlson  She was having labs for pre-op appt and found to be anemic  Was told this in the past and was on oral iron supplementation  Heavy menstrual cycles for the past 3 months  Has cycles about every 28-30 days  LMP 8/28/2023  Denies any palpitations, dizziness, or lightheaded         /70   Pulse 79   Temp 97.1 °F (36.2 °C) (Tympanic)   Ht 5' 1" (1.549 m)   Wt 98.2 kg (216 lb 7.9 oz)   LMP 07/31/2023 (Approximate)   SpO2 100%   BMI 40.91 kg/m²     Review of Systems   Constitutional:  Negative for activity change, appetite change, chills, diaphoresis, fatigue, fever and unexpected weight change.   HENT: Negative.     Respiratory:  Negative for cough and shortness of breath.    Cardiovascular:  Negative for chest pain, palpitations and leg swelling.   Gastrointestinal: Negative.    Genitourinary: Negative.    Musculoskeletal: Negative.    Skin:  Negative for color change, pallor, rash and wound.   Allergic/Immunologic: Negative for immunocompromised state.   Neurological: Negative.  Negative for dizziness and facial asymmetry.   Hematological:  Negative for adenopathy. Does not bruise/bleed easily.   Psychiatric/Behavioral:  Negative for agitation, behavioral problems and confusion.        Objective:      Physical Exam  Vitals and nursing note reviewed.   Constitutional:       General: She is not in acute distress.     Appearance: Normal appearance. She is well-developed. She is not diaphoretic.   HENT:      Head: Normocephalic and atraumatic.   Cardiovascular:      Rate and Rhythm: Normal rate and regular rhythm.      Heart sounds: Normal heart sounds. No murmur heard.  Pulmonary:      Effort: Pulmonary effort is normal. No respiratory distress.      Breath sounds: Normal breath sounds.   Musculoskeletal:         General: Normal range " of motion.   Skin:     General: Skin is warm and dry.      Findings: No rash.   Neurological:      Mental Status: She is alert.   Psychiatric:         Mood and Affect: Mood normal.         Behavior: Behavior normal. Behavior is cooperative.         Thought Content: Thought content normal.         Judgment: Judgment normal.         Assessment:       1. Iron deficiency anemia, unspecified iron deficiency anemia type    2. BMI 40.0-44.9, adult        Plan:       Main was seen today for labs only.    Diagnoses and all orders for this visit:    Iron deficiency anemia, unspecified iron deficiency anemia type  -     CBC Auto Differential; Future  -     Ferritin; Future  -     Iron and TIBC; Future  -     Vitamin B12; Future    BMI 40.0-44.9, adult      Will update labs today with iron studies  Consider rx iron vs iron transfusions  Will notify pt of results via portal  Follow up for worsening or no improvement in symptoms and PRN.

## 2023-09-06 DIAGNOSIS — D50.9 IRON DEFICIENCY ANEMIA, UNSPECIFIED IRON DEFICIENCY ANEMIA TYPE: Primary | ICD-10-CM

## 2023-09-06 RX ORDER — IRON,CARBONYL/ASCORBIC ACID 100-250 MG
1 TABLET ORAL DAILY
Qty: 90 TABLET | Refills: 0 | Status: SHIPPED | OUTPATIENT
Start: 2023-09-06 | End: 2023-11-17 | Stop reason: SDUPTHER

## 2023-09-08 ENCOUNTER — ANESTHESIA EVENT (OUTPATIENT)
Dept: SURGERY | Facility: HOSPITAL | Age: 28
End: 2023-09-08
Payer: COMMERCIAL

## 2023-09-11 ENCOUNTER — ANESTHESIA (OUTPATIENT)
Dept: SURGERY | Facility: HOSPITAL | Age: 28
End: 2023-09-11
Payer: COMMERCIAL

## 2023-09-11 ENCOUNTER — HOSPITAL ENCOUNTER (OUTPATIENT)
Facility: HOSPITAL | Age: 28
Discharge: HOME | End: 2023-09-11
Attending: ORTHOPAEDIC SURGERY | Admitting: ORTHOPAEDIC SURGERY
Payer: COMMERCIAL

## 2023-09-11 VITALS
HEART RATE: 66 BPM | OXYGEN SATURATION: 97 % | TEMPERATURE: 98 F | WEIGHT: 220.56 LBS | BODY MASS INDEX: 41.64 KG/M2 | RESPIRATION RATE: 12 BRPM | DIASTOLIC BLOOD PRESSURE: 61 MMHG | SYSTOLIC BLOOD PRESSURE: 108 MMHG | HEIGHT: 61 IN

## 2023-09-11 DIAGNOSIS — M67.431 GANGLION CYST OF WRIST, RIGHT: ICD-10-CM

## 2023-09-11 DIAGNOSIS — M67.40 GANGLION CYST: Primary | ICD-10-CM

## 2023-09-11 LAB
B-HCG UR QL: NEGATIVE
CTP QC/QA: YES
POCT GLUCOSE: 94 MG/DL (ref 70–110)
POCT GLUCOSE: 95 MG/DL (ref 70–110)

## 2023-09-11 PROCEDURE — 71000015 HC POSTOP RECOV 1ST HR: Performed by: ORTHOPAEDIC SURGERY

## 2023-09-11 PROCEDURE — 82962 GLUCOSE BLOOD TEST: CPT | Performed by: ORTHOPAEDIC SURGERY

## 2023-09-11 PROCEDURE — 37000008 HC ANESTHESIA 1ST 15 MINUTES: Performed by: ORTHOPAEDIC SURGERY

## 2023-09-11 PROCEDURE — 63600175 PHARM REV CODE 636 W HCPCS: Performed by: ORTHOPAEDIC SURGERY

## 2023-09-11 PROCEDURE — 63600175 PHARM REV CODE 636 W HCPCS

## 2023-09-11 PROCEDURE — 25111 PR EXCIS PRIMARY GANGLION WRIST: ICD-10-PCS | Mod: RT,,, | Performed by: ORTHOPAEDIC SURGERY

## 2023-09-11 PROCEDURE — 71000033 HC RECOVERY, INTIAL HOUR: Performed by: ORTHOPAEDIC SURGERY

## 2023-09-11 PROCEDURE — 37000009 HC ANESTHESIA EA ADD 15 MINS: Performed by: ORTHOPAEDIC SURGERY

## 2023-09-11 PROCEDURE — 88304 TISSUE EXAM BY PATHOLOGIST: CPT | Performed by: STUDENT IN AN ORGANIZED HEALTH CARE EDUCATION/TRAINING PROGRAM

## 2023-09-11 PROCEDURE — 25000003 PHARM REV CODE 250: Performed by: ORTHOPAEDIC SURGERY

## 2023-09-11 PROCEDURE — D9220A PRA ANESTHESIA: Mod: ,,, | Performed by: NURSE ANESTHETIST, CERTIFIED REGISTERED

## 2023-09-11 PROCEDURE — 63600175 PHARM REV CODE 636 W HCPCS: Performed by: NURSE ANESTHETIST, CERTIFIED REGISTERED

## 2023-09-11 PROCEDURE — D9220A PRA ANESTHESIA: ICD-10-PCS | Mod: ,,, | Performed by: NURSE ANESTHETIST, CERTIFIED REGISTERED

## 2023-09-11 PROCEDURE — 88304 PR  SURG PATH,LEVEL III: ICD-10-PCS | Mod: 26,,, | Performed by: STUDENT IN AN ORGANIZED HEALTH CARE EDUCATION/TRAINING PROGRAM

## 2023-09-11 PROCEDURE — 25000003 PHARM REV CODE 250: Performed by: NURSE ANESTHETIST, CERTIFIED REGISTERED

## 2023-09-11 PROCEDURE — 36000707: Performed by: ORTHOPAEDIC SURGERY

## 2023-09-11 PROCEDURE — 88304 TISSUE EXAM BY PATHOLOGIST: CPT | Mod: 26,,, | Performed by: STUDENT IN AN ORGANIZED HEALTH CARE EDUCATION/TRAINING PROGRAM

## 2023-09-11 PROCEDURE — 81025 URINE PREGNANCY TEST: CPT | Performed by: ORTHOPAEDIC SURGERY

## 2023-09-11 PROCEDURE — 36000706: Performed by: ORTHOPAEDIC SURGERY

## 2023-09-11 PROCEDURE — 25111 REMOVE WRIST TENDON LESION: CPT | Mod: RT,,, | Performed by: ORTHOPAEDIC SURGERY

## 2023-09-11 RX ORDER — SODIUM CHLORIDE 0.9 % (FLUSH) 0.9 %
10 SYRINGE (ML) INJECTION
Status: DISCONTINUED | OUTPATIENT
Start: 2023-09-11 | End: 2023-09-11 | Stop reason: HOSPADM

## 2023-09-11 RX ORDER — MIDAZOLAM HYDROCHLORIDE 1 MG/ML
INJECTION INTRAMUSCULAR; INTRAVENOUS
Status: DISCONTINUED | OUTPATIENT
Start: 2023-09-11 | End: 2023-09-11

## 2023-09-11 RX ORDER — CHLORHEXIDINE GLUCONATE ORAL RINSE 1.2 MG/ML
10 SOLUTION DENTAL
Status: DISCONTINUED | OUTPATIENT
Start: 2023-09-11 | End: 2023-09-11 | Stop reason: HOSPADM

## 2023-09-11 RX ORDER — SODIUM CHLORIDE, SODIUM LACTATE, POTASSIUM CHLORIDE, CALCIUM CHLORIDE 600; 310; 30; 20 MG/100ML; MG/100ML; MG/100ML; MG/100ML
INJECTION, SOLUTION INTRAVENOUS CONTINUOUS
Status: DISCONTINUED | OUTPATIENT
Start: 2023-09-11 | End: 2023-09-11 | Stop reason: HOSPADM

## 2023-09-11 RX ORDER — HYDROCODONE BITARTRATE AND ACETAMINOPHEN 5; 325 MG/1; MG/1
1 TABLET ORAL EVERY 6 HOURS PRN
Qty: 15 TABLET | Refills: 0 | Status: SHIPPED | OUTPATIENT
Start: 2023-09-11 | End: 2023-11-08

## 2023-09-11 RX ORDER — FENTANYL CITRATE 50 UG/ML
INJECTION, SOLUTION INTRAMUSCULAR; INTRAVENOUS
Status: DISCONTINUED | OUTPATIENT
Start: 2023-09-11 | End: 2023-09-11

## 2023-09-11 RX ORDER — HYDROCODONE BITARTRATE AND ACETAMINOPHEN 5; 325 MG/1; MG/1
1 TABLET ORAL
Status: DISCONTINUED | OUTPATIENT
Start: 2023-09-11 | End: 2023-09-11 | Stop reason: HOSPADM

## 2023-09-11 RX ORDER — DEXMEDETOMIDINE HYDROCHLORIDE 100 UG/ML
INJECTION, SOLUTION INTRAVENOUS
Status: DISCONTINUED | OUTPATIENT
Start: 2023-09-11 | End: 2023-09-11

## 2023-09-11 RX ORDER — LIDOCAINE HYDROCHLORIDE 20 MG/ML
INJECTION INTRAVENOUS
Status: DISCONTINUED | OUTPATIENT
Start: 2023-09-11 | End: 2023-09-11

## 2023-09-11 RX ORDER — FENTANYL CITRATE 50 UG/ML
25 INJECTION, SOLUTION INTRAMUSCULAR; INTRAVENOUS EVERY 5 MIN PRN
Status: DISCONTINUED | OUTPATIENT
Start: 2023-09-11 | End: 2023-09-11 | Stop reason: HOSPADM

## 2023-09-11 RX ORDER — LIDOCAINE HYDROCHLORIDE 20 MG/ML
INJECTION, SOLUTION EPIDURAL; INFILTRATION; INTRACAUDAL; PERINEURAL
Status: DISCONTINUED | OUTPATIENT
Start: 2023-09-11 | End: 2023-09-11 | Stop reason: HOSPADM

## 2023-09-11 RX ORDER — ONDANSETRON 2 MG/ML
4 INJECTION INTRAMUSCULAR; INTRAVENOUS ONCE AS NEEDED
Status: DISCONTINUED | OUTPATIENT
Start: 2023-09-11 | End: 2023-09-11 | Stop reason: HOSPADM

## 2023-09-11 RX ORDER — ONDANSETRON 2 MG/ML
INJECTION INTRAMUSCULAR; INTRAVENOUS
Status: DISCONTINUED | OUTPATIENT
Start: 2023-09-11 | End: 2023-09-11

## 2023-09-11 RX ORDER — MEPERIDINE HYDROCHLORIDE 25 MG/ML
12.5 INJECTION INTRAMUSCULAR; INTRAVENOUS; SUBCUTANEOUS ONCE
Status: DISCONTINUED | OUTPATIENT
Start: 2023-09-11 | End: 2023-09-11 | Stop reason: HOSPADM

## 2023-09-11 RX ORDER — HYDROCODONE BITARTRATE AND ACETAMINOPHEN 5; 325 MG/1; MG/1
1 TABLET ORAL EVERY 4 HOURS PRN
Status: DISCONTINUED | OUTPATIENT
Start: 2023-09-11 | End: 2023-09-11 | Stop reason: HOSPADM

## 2023-09-11 RX ORDER — LIDOCAINE HYDROCHLORIDE 20 MG/ML
INJECTION, SOLUTION INFILTRATION; PERINEURAL
Status: DISCONTINUED
Start: 2023-09-11 | End: 2023-09-11 | Stop reason: HOSPADM

## 2023-09-11 RX ORDER — DIPHENHYDRAMINE HYDROCHLORIDE 50 MG/ML
25 INJECTION INTRAMUSCULAR; INTRAVENOUS EVERY 6 HOURS PRN
Status: DISCONTINUED | OUTPATIENT
Start: 2023-09-11 | End: 2023-09-11 | Stop reason: HOSPADM

## 2023-09-11 RX ORDER — PROPOFOL 10 MG/ML
INJECTION, EMULSION INTRAVENOUS
Status: DISCONTINUED | OUTPATIENT
Start: 2023-09-11 | End: 2023-09-11

## 2023-09-11 RX ORDER — CEFAZOLIN SODIUM 2 G/50ML
2 SOLUTION INTRAVENOUS
Status: COMPLETED | OUTPATIENT
Start: 2023-09-11 | End: 2023-09-11

## 2023-09-11 RX ORDER — CHLORHEXIDINE GLUCONATE ORAL RINSE 1.2 MG/ML
10 SOLUTION DENTAL 2 TIMES DAILY
Status: DISCONTINUED | OUTPATIENT
Start: 2023-09-11 | End: 2023-09-11 | Stop reason: HOSPADM

## 2023-09-11 RX ADMIN — DEXMEDETOMIDINE HYDROCHLORIDE 4 MCG: 100 INJECTION, SOLUTION INTRAVENOUS at 07:09

## 2023-09-11 RX ADMIN — LIDOCAINE HYDROCHLORIDE 50 MG: 20 INJECTION INTRAVENOUS at 07:09

## 2023-09-11 RX ADMIN — FENTANYL CITRATE 12.5 MCG: 50 INJECTION, SOLUTION INTRAMUSCULAR; INTRAVENOUS at 07:09

## 2023-09-11 RX ADMIN — PROPOFOL 25 MG: 10 INJECTION, EMULSION INTRAVENOUS at 07:09

## 2023-09-11 RX ADMIN — SODIUM CHLORIDE, POTASSIUM CHLORIDE, SODIUM LACTATE AND CALCIUM CHLORIDE: 600; 310; 30; 20 INJECTION, SOLUTION INTRAVENOUS at 07:09

## 2023-09-11 RX ADMIN — MIDAZOLAM HYDROCHLORIDE 2 MG: 1 INJECTION INTRAMUSCULAR; INTRAVENOUS at 07:09

## 2023-09-11 RX ADMIN — ONDANSETRON 4 MG: 2 INJECTION INTRAMUSCULAR; INTRAVENOUS at 07:09

## 2023-09-11 RX ADMIN — CEFAZOLIN SODIUM 2 G: 2 SOLUTION INTRAVENOUS at 07:09

## 2023-09-11 RX ADMIN — FENTANYL CITRATE 50 MCG: 50 INJECTION, SOLUTION INTRAMUSCULAR; INTRAVENOUS at 07:09

## 2023-09-11 NOTE — PLAN OF CARE
Reviewed and completed all discharge orders. Printed AVS and educated patient and family member of its entirety, including physician's orders, follow-up appt, medications, when to call, and when to report to the emergency room. Reviewed prescriptions, pharmacy information. I encouraged questions, answered them thoroughly, and evaluated my instructions via teach-back method. Patient has met all hospital discharge criteria at this point.

## 2023-09-11 NOTE — TRANSFER OF CARE
"Anesthesia Transfer of Care Note    Patient: Main Aguilar    Procedure(s) Performed: Procedure(s) (LRB):  EXCISION, GANGLION CYST, WRIST (Right)    Patient location: PACU    Anesthesia Type: MAC    Transport from OR: Transported from OR on room air with adequate spontaneous ventilation    Post pain: adequate analgesia    Post assessment: no apparent anesthetic complications and tolerated procedure well    Post vital signs: stable    Level of consciousness: awake, alert and oriented    Nausea/Vomiting: no nausea/vomiting    Complications: none    Transfer of care protocol was followed      Last vitals:   Visit Vitals  /66 (BP Location: Right arm, Patient Position: Sitting)   Pulse 86   Temp 36.3 °C (97.4 °F) (Temporal)   Resp 18   Ht 5' 1" (1.549 m)   Wt 100 kg (220 lb 9.1 oz)   SpO2 100%   Breastfeeding No   BMI 41.68 kg/m²     "

## 2023-09-11 NOTE — ANESTHESIA POSTPROCEDURE EVALUATION
Anesthesia Post Evaluation    Patient: Main Aguilar    Procedure(s) Performed: Procedure(s) (LRB):  EXCISION, GANGLION CYST, WRIST (Right)    Final Anesthesia Type: MAC      Patient location during evaluation: PACU  Patient participation: Yes- Able to Participate  Level of consciousness: awake and alert and oriented  Post-procedure vital signs: reviewed and stable  Pain management: adequate  Airway patency: patent    PONV status at discharge: No PONV  Anesthetic complications: no      Cardiovascular status: blood pressure returned to baseline, stable and hemodynamically stable  Respiratory status: unassisted  Hydration status: euvolemic  Follow-up not needed.          Vitals Value Taken Time   /62 09/11/23 0838   Temp 36.6 °C (97.9 °F) 09/11/23 0756   Pulse 66 09/11/23 0839   Resp 11 09/11/23 0839   SpO2 98 % 09/11/23 0839   Vitals shown include unvalidated device data.      Event Time   Out of Recovery 08:26:00         Pain/Amanda Score: Amanda Score: 9 (9/11/2023  8:26 AM)

## 2023-09-11 NOTE — DISCHARGE SUMMARY
[facility]  Discharge Note  Short Stay    Procedure(s) (LRB):  EXCISION, GANGLION CYST, WRIST (Right) dorsal wrist      OUTCOME: Patient tolerated treatment/procedure well without complication and is now ready for discharge.    DISPOSITION: Home or Self Care    FINAL DIAGNOSIS:  Ganglion cyst dorsal right wrist    FOLLOWUP: In clinic    DISCHARGE INSTRUCTIONS:    Discharge Procedure Orders   Diet general     Call MD for:  temperature >100.4     Call MD for:  persistent nausea and vomiting     Call MD for:  severe uncontrolled pain     Call MD for:  difficulty breathing, headache or visual disturbances     Call MD for:  redness, tenderness, or signs of infection (pain, swelling, redness, odor or green/yellow discharge around incision site)     Call MD for:  hives     Call MD for:  persistent dizziness or light-headedness     Call MD for:  extreme fatigue        TIME SPENT ON DISCHARGE:  20 minutes

## 2023-09-11 NOTE — OP NOTE
[facility]  General Surgery  Operative Note    SUMMARY     Date of Procedure: 9/11/2023     Procedure: Procedure(s) (LRB):  EXCISION, GANGLION CYST, WRIST (Right)   dorsal wrist    Surgeon(s) and Role:     * Vinay Camarena MD - Primary    Assisting Surgeon: None    Pre-Operative Diagnosis: Ganglion of right wrist [M67.431] dorsal wrist    Post-Operative Diagnosis: Post-Op Diagnosis Codes:     * Ganglion of right wrist [M67.431] dorsal wrist    Anesthesia:  Local with sedation    Description:  The patient is taken to the operating room where 10 cc of 2% plain lidocaine use local anesthetic about the dorsal aspect of the right wrist.  Satisfactory anesthesia had been achieved the right hand was prepped and draped in the usual sterile fashion.  After exsanguination of the extremity a proximal tourniquet was inflated to 250 mmHg after patient received 2 g of Ancef intravenously preoperatively.  At this time a transverse incision was made over the mass.  Dissection was carried out under 3.5 loupe magnification.  The extensor tendons were carefully retracted there was a ganglion cyst coming from the dorsal wrist capsule about a cm in size.  The cyst was excised and submitted to pathology for examination.  A 3 mm window was made in the dorsal wrist capsule.  No additional pathology was encountered.  Subcutaneous tissue was closed using 3-0 Vicryl suture.  Skin was closed using horizontal mattress 4-0 nylon suture.  Xeroform gauze 4x4s and 2 ABD pads overwrapped with a 3 in gauze dressing.  The patient tolerated the procedure well and was transferred to the recovery room in satisfactory condition.    Significant Surgical Tasks Conducted by the Assistant(s), if Applicable:  No assistant    Complications:  None     Estimated Blood Loss (EBL):  5 mL           Implants: None    Specimens:  Ganglion cyst dorsal right wrist           Condition: Good    Disposition: PACU - hemodynamically  stable.    Attestation: I was present and scrubbed for the entire procedure.

## 2023-09-11 NOTE — DISCHARGE INSTRUCTIONS
Nozin Instructions  Goal: the goal of Nozin is to reduce the risk of post-procedural infections by bacteria in the nasal cavity. Think of it as hand  for your nose.    How to use:    1. Shake Nozin bottle well    2. Take a cotton swab and apply 4 drops to one tip    3. Insert cotton tip into one nostril, being sure not to go deeper into nose than tip of the swab.    4. Swab nostril 6 times counterclockwise and 6 times clockwise. Make sure to swab the inside front pocket of the nostril.    5. Take swab out and apply 2 drops to the same cotton tip. Repeat steps 3 and 4 in the other nostril.        Do steps 1-5 twice a day for 7 days.      After Hand Surgery  After surgery, the better you take care of yourself--especially your hand--the sooner it will heal. Follow your surgeons instructions. Try not to bump your hand, and dont move or lift anything while youre still wearing bandages, a splint, or a cast.  Care for your hand    Keep your hand elevated above heart level as much as possible for the first several days after surgery. This helps reduce swelling and pain.  To help prevent infection and speed healing, take care not to get your cast or bandages wet.  Keep dressing clean, dry, & intact until your follow up appointment.   Relieve pain as directed  Your surgeon may prescribe pain medicine or suggest you take an anti-inflammatory medicine. You might also be instructed to apply ice (or another cold source) to your hand. If you use ice cubes, put them in a plastic bag and rest it on top of your bandages. Leave the cold source on your hand for as long as its comfortable. Do this several times a day for the first few days after surgery. It may take several minutes before you can feel the cold through the cast or bandages.  Follow up with your surgeon  During a follow-up visit after surgery, your surgeon will check your progress. The stitches, bandages, splint, or cast may be removed. A new cast or splint  may be placed. If your hand has healed enough, your surgeon may prescribe exercises.  Do prescribed hand exercises  Your surgeon may recommend that you do exercises. These may be done under the guidance of a physical or occupational therapist. The exercises strengthen your hand, help you regain flexibility, and restore proper function. Do the exercises as advised.  Call your surgeon if you have...  A fever higher than 100.4°F (38.0°C) taken by mouth  Side effects from your medicine, such as prolonged nausea  A wet or loose dressing, or a dressing that is too tight  Excessive bleeding  Increased, ongoing pain or numbness  Signs of infection (such as drainage, warmth, or redness) at the incision site   Date Last Reviewed: 11/11/2015  © 1625-7595 [employer]. [address] [place] [address], [place] [address]. All rights reserved. This information is not intended as a substitute for professional medical care. Always follow your healthcare professional's instructions.

## 2023-09-14 ENCOUNTER — TELEPHONE (OUTPATIENT)
Dept: HEMATOLOGY/ONCOLOGY | Facility: CLINIC | Age: 28
End: 2023-09-14

## 2023-09-14 NOTE — PROGRESS NOTES
SUBJECTIVE:      Patient ID: Main Aguilar is a 28 y.o. female.    HPI: Ms. Aguilar is here today for post-operative visit #1.  She is 4 days status post EXCISION, GANGLION CYST, WRIST (Right) dorsal wrist by Dr. Camarena on 9/11/23. She reports that she is doing well, reports some swelling to the hand.  Pain is 0/10. She is taking OTC medication for pain. She reports burping after taking her metformin and iron pills post surgery.  She has been compliant with postop instructions and keeping the extremity dry. She denies fever, chills, and sweats since the time of the surgery.     Past Medical History:   Diagnosis Date    Asthma     Nontoxic single thyroid nodule     PCOS (polycystic ovarian syndrome)     Prediabetes     Seborrheic dermatitis      Past Surgical History:   Procedure Laterality Date    EXCISION OF GANGLION OF WRIST Right 9/11/2023    Procedure: EXCISION, GANGLION CYST, WRIST;  Surgeon: Vinay Camarena MD;  Location: [facility] OR;  Service: Orthopedics;  Laterality: Right;  excision ganglion cyst dorsal right wrist     Family History   Problem Relation Age of Onset    Cancer Mother         Leukemia    Miscarriages / Stillbirths Mother     Hypertension Father     Cancer Maternal Grandmother         Colon    Cancer Paternal Grandmother         Breast     Social History     Socioeconomic History    Marital status:    Tobacco Use    Smoking status: Never     Passive exposure: Never    Smokeless tobacco: Never   Substance and Sexual Activity    Alcohol use: Yes     Alcohol/week: 1.0 standard drink of alcohol     Types: 1 Drinks containing 0.5 oz of alcohol per week     Comment: Maybe once every 3 - 4 months    Drug use: Not Currently     Types: Marijuana     Comment: Tried it over 4 years ago    Sexual activity: Yes     Partners: Male     Birth control/protection: None     Medication List with Changes/Refills   Current Medications    CYCLOBENZAPRINE (FLEXERIL) 10 MG TABLET    Take 10 mg by  "mouth every 8 (eight) hours as needed.    FLUOCINOLONE (DERMA-SMOOTHE) 0.01 % EXTERNAL OIL    Apply oil to scalp once to twice a day as needed for dryness    HYDROCODONE-ACETAMINOPHEN (NORCO) 5-325 MG PER TABLET    Take 1 tablet by mouth every 6 (six) hours as needed for Pain.    IRON-VITAMIN C 100-250 MG, ICAR-C, (ICAR-C) 100-250 MG TAB    Take 1 tablet by mouth Daily.    KETOCONAZOLE (NIZORAL) 2 % CREAM    AAA bid    KETOCONAZOLE (NIZORAL) 2 % SHAMPOO    Wash hair with medicated shampoo at least 1x/week - let sit on scalp at least 5 minutes prior to rinsing    METFORMIN (GLUCOPHAGE) 500 MG TABLET    Take 500 mg by mouth 2 (two) times daily with meals.    NABUMETONE (RELAFEN) 500 MG TABLET    Take 1 tablet (500 mg total) by mouth 2 (two) times daily as needed for Pain.    TRIAMCINOLONE ACETONIDE 0.025% (KENALOG) 0.025 % CREAM    Apply topically 2 (two) times daily as needed. Mild steroid. Use sparingly for 1-2 weeks if needed then stop.     Review of patient's allergies indicates:   Allergen Reactions    Lactase Rash     OBJECTIVE:     Physical exam:    Vitals:    09/15/23 0913   Weight: 100 kg (220 lb 7.4 oz)   Height: 5' 1" (1.549 m)   PainSc: 0-No pain   PainLoc: Wrist     Vital signs are stable, patient is afebrile.  Patient is well dressed and well groomed, no acute distress.  Alert and oriented to person, place, and time.    Right UE:  Post op dressing taken down.   Incision is clean, dry, and intact.   There is no erythema or exudate. There is no sign of any infection.   Mild swelling to hand and wrist  Mild ecchymosis locally  She is NVI.   Sutures in place.   2+ pulses noted.  Cap refill <2 seconds  Motor intact to hand    ASSESSMENT         Encounter Diagnosis   Name Primary?    S/P excision of ganglion cyst Yes            4 days status post EXCISION, GANGLION CYST, WRIST (Right) dorsal wrist    PLAN:           Main was seen today for post-op evaluation.    Diagnoses and all orders for this " visit:    S/P excision of ganglion cyst      - PO instruction reviewed and provided to patient  - The incision was cleaned with hydrogen peroxide and normal saline. A sterile Band-Aid was applied.   - Patient may clean the incision daily as above.   - Patient may use brace as needed for symptomatic relief.    - path results pending  - Patient should notify the office of any signs or symptoms of infection including fevers, erythema, purulent drainage, increasing pain.    - Follow up for suture removal     POST OPERATIVE INSTRUCTIONS - Visit #1    BANDAGES/DRESSING/BATHING:  We have removed the dressing, cleaned your incision, and put on a band-aid at your first post op visit today. You may clean the incision daily as we did today. Keep the incision clean and dry. When showering, you may cover the incision with a plastic bag/umbrella bag.   Do not use Neosporin or other topical ointments or creams on the incision. If you are concerned about any redness or drainage of the incisions, please call the office.    SWELLING  Some swelling of your arm, hand and fingers is normal. The swelling can be decreased by  elevating your arm on a few pillows when lying down. Swelling can be further controlled by cold therapy over the surgical site. Flexion/extension of the fingers (opening and closing your hands) will also help to relieve swelling and prevent stiffness.    ACTIVITY  You may use the hand and wrist as tolerated for light activity. You are encouraged to bend the wrist, elbow and fingers as soon as the initial surgical dressing is removed. Avoid lifting, pushing, or pulling any object greater than 5-10 pounds for the first 10-14 days.     BRACE  Wear your brace or splint as directed.    MEDICATIONS  Take pain medicines exactly as directed.  If the doctor gave you a prescription medicine for pain, take it as prescribed.  If you are not taking a prescription pain medicine, take an over-the-counter medicine such as  acetaminophen (Tylenol), ibuprofen (Advil, Motrin), or naproxen (Aleve) as long as you do not have an allergy or medical condition that prevents you from taking them.  Do not take two or more pain medicines at the same time unless the doctor told you to. Many pain medicines have acetaminophen, which is Tylenol. Too much acetaminophen (Tylenol) can be harmful.    FOLLOW -UP  You should be scheduled for a post-op appointment within the 10-14 days following surgery, at which time we will review your surgery, remove sutures and evaluate incisions, review your post-operative program and answer any of your questions.          Missy Valencia PA-C   [facility]

## 2023-09-14 NOTE — TELEPHONE ENCOUNTER
Spoke to patient in reference to Hematology referral from Nicolette Lyles NP.  Appointment scheduled per patient's request next available morning at the [place].  Appointment notice via pt portal.

## 2023-09-15 ENCOUNTER — OFFICE VISIT (OUTPATIENT)
Dept: ORTHOPEDICS | Facility: CLINIC | Age: 28
End: 2023-09-15
Payer: COMMERCIAL

## 2023-09-15 VITALS — WEIGHT: 220.44 LBS | BODY MASS INDEX: 41.62 KG/M2 | HEIGHT: 61 IN

## 2023-09-15 DIAGNOSIS — Z98.890 S/P EXCISION OF GANGLION CYST: Primary | ICD-10-CM

## 2023-09-15 PROCEDURE — 3044F PR MOST RECENT HEMOGLOBIN A1C LEVEL <7.0%: ICD-10-PCS | Mod: CPTII,S$GLB,,

## 2023-09-15 PROCEDURE — 3044F HG A1C LEVEL LT 7.0%: CPT | Mod: CPTII,S$GLB,,

## 2023-09-15 PROCEDURE — 1159F PR MEDICATION LIST DOCUMENTED IN MEDICAL RECORD: ICD-10-PCS | Mod: CPTII,S$GLB,,

## 2023-09-15 PROCEDURE — 1159F MED LIST DOCD IN RCRD: CPT | Mod: CPTII,S$GLB,,

## 2023-09-15 PROCEDURE — 99999 PR PBB SHADOW E&M-EST. PATIENT-LVL III: CPT | Mod: PBBFAC,,,

## 2023-09-15 PROCEDURE — 99024 POSTOP FOLLOW-UP VISIT: CPT | Mod: S$GLB,,,

## 2023-09-15 PROCEDURE — 99024 PR POST-OP FOLLOW-UP VISIT: ICD-10-PCS | Mod: S$GLB,,,

## 2023-09-15 PROCEDURE — 99999 PR PBB SHADOW E&M-EST. PATIENT-LVL III: ICD-10-PCS | Mod: PBBFAC,,,

## 2023-09-15 PROCEDURE — 3008F BODY MASS INDEX DOCD: CPT | Mod: CPTII,S$GLB,,

## 2023-09-15 PROCEDURE — 3008F PR BODY MASS INDEX (BMI) DOCUMENTED: ICD-10-PCS | Mod: CPTII,S$GLB,,

## 2023-09-17 LAB
FINAL PATHOLOGIC DIAGNOSIS: NORMAL
GROSS: NORMAL
Lab: NORMAL
MICROSCOPIC EXAM: NORMAL

## 2023-09-18 ENCOUNTER — PATIENT MESSAGE (OUTPATIENT)
Dept: ORTHOPEDICS | Facility: CLINIC | Age: 28
End: 2023-09-18

## 2023-09-22 ENCOUNTER — PATIENT MESSAGE (OUTPATIENT)
Dept: ORTHOPEDICS | Facility: CLINIC | Age: 28
End: 2023-09-22

## 2023-09-22 ENCOUNTER — TELEPHONE (OUTPATIENT)
Dept: ORTHOPEDICS | Facility: CLINIC | Age: 28
End: 2023-09-22

## 2023-09-22 NOTE — TELEPHONE ENCOUNTER
Attempted to contact patient multiple times on different days at different times, no answer. Left VM multiples times to call back. [email or web address] status is active so [employer] msg sent to r/s appt

## 2023-09-28 NOTE — PROGRESS NOTES
SUBJECTIVE:      Patient ID: Main Aguilar is a 28 y.o. female.    HPI: Ms. Aguilar is here today for post-operative visit #2.  She is 18 days status post EXCISION, GANGLION CYST, WRIST (Right) dorsal wrist by Dr. Camarena on 9/11/23. She reports that she is doing well.  Pain is 0/10.  She is taking no medication for pain. She is wearing a brace as needed. She has been compliant with postop instructions and keeping the extremity dry. She denies fever, chills, and sweats since the time of the surgery.     Interval hx 9/15/23: Ms. Aguilar is here today for post-operative visit #1.  She is 4 days status post EXCISION, GANGLION CYST, WRIST (Right) dorsal wrist by Dr. Camarena on 9/11/23. She reports that she is doing well, reports some swelling to the hand.  Pain is 0/10. She is taking OTC medication for pain. She reports burping after taking her metformin and iron pills post surgery.  She has been compliant with postop instructions and keeping the extremity dry. She denies fever, chills, and sweats since the time of the surgery.     Past Medical History:   Diagnosis Date    Asthma     Nontoxic single thyroid nodule     PCOS (polycystic ovarian syndrome)     Prediabetes     Seborrheic dermatitis      Past Surgical History:   Procedure Laterality Date    EXCISION OF GANGLION OF WRIST Right 9/11/2023    Procedure: EXCISION, GANGLION CYST, WRIST;  Surgeon: Vinay Camarena MD;  Location: [facility];  Service: Orthopedics;  Laterality: Right;  excision ganglion cyst dorsal right wrist     Family History   Problem Relation Age of Onset    Cancer Mother         Leukemia    Miscarriages / Stillbirths Mother     Hypertension Father     Cancer Maternal Grandmother         Colon    Cancer Paternal Grandmother         Breast     Social History     Socioeconomic History    Marital status:    Tobacco Use    Smoking status: Never     Passive exposure: Never    Smokeless tobacco: Never   Substance and Sexual Activity     "Alcohol use: Yes     Alcohol/week: 1.0 standard drink of alcohol     Types: 1 Drinks containing 0.5 oz of alcohol per week     Comment: Maybe once every 3 - 4 months    Drug use: Not Currently     Types: Marijuana     Comment: Tried it over 4 years ago    Sexual activity: Yes     Partners: Male     Birth control/protection: None     Medication List with Changes/Refills   Current Medications    CYCLOBENZAPRINE (FLEXERIL) 10 MG TABLET    Take 10 mg by mouth every 8 (eight) hours as needed.    FLUOCINOLONE (DERMA-SMOOTHE) 0.01 % EXTERNAL OIL    Apply oil to scalp once to twice a day as needed for dryness    HYDROCODONE-ACETAMINOPHEN (NORCO) 5-325 MG PER TABLET    Take 1 tablet by mouth every 6 (six) hours as needed for Pain.    IRON-VITAMIN C 100-250 MG, ICAR-C, (ICAR-C) 100-250 MG TAB    Take 1 tablet by mouth Daily.    KETOCONAZOLE (NIZORAL) 2 % CREAM    AAA bid    KETOCONAZOLE (NIZORAL) 2 % SHAMPOO    Wash hair with medicated shampoo at least 1x/week - let sit on scalp at least 5 minutes prior to rinsing    METFORMIN (GLUCOPHAGE) 500 MG TABLET    Take 500 mg by mouth 2 (two) times daily with meals.    NABUMETONE (RELAFEN) 500 MG TABLET    Take 1 tablet (500 mg total) by mouth 2 (two) times daily as needed for Pain.    TRIAMCINOLONE ACETONIDE 0.025% (KENALOG) 0.025 % CREAM    Apply topically 2 (two) times daily as needed. Mild steroid. Use sparingly for 1-2 weeks if needed then stop.     Review of patient's allergies indicates:   Allergen Reactions    Lactase Rash       OBJECTIVE:     Physical exam:    Vitals:    09/29/23 0805   Weight: 99.8 kg (220 lb)   Height: 5' 1" (1.549 m)   PainSc: 0-No pain   PainLoc: Wrist     Vital signs are stable, patient is afebrile.  Patient is well dressed and well groomed, no acute distress.  Alert and oriented to person, place, and time.    Right UE:  Incision is clean, dry, and intact. Wound margins well approximated  There is no erythema or exudate. There is no sign of any " infection.   She is NVI.   Sutures in place.   2+ pulses noted.  Cap refill <2 seconds  Motor intact to hand    Final Pathologic Diagnosis Soft tissue, right dorsal wrist, excision:   - Ganglion cyst   - Negative for malignancy    Comment: Interp By Ephraim Castro MD, PhD, Signed on 09/17/2023 at 10:33       ASSESSMENT         Encounter Diagnosis   Name Primary?    S/P excision of ganglion cyst Yes            18 days status post EXCISION, GANGLION CYST, WRIST (Right) dorsal wrist    PLAN:           Main was seen today for pain and post-op evaluation.    Diagnoses and all orders for this visit:    S/P excision of ganglion cyst        - PO instruction reviewed and provided to patient  - The incision was cleaned with hydrogen peroxide. Sutures removed with no difficulty. Steri-Strips applied.   - Patient may use brace as needed for symptomatic relief.    - Hand exercises and stretches provided  - Patient should notify the office of any signs or symptoms of infection including fevers, erythema, purulent drainage, increasing pain.    - Follow up PRN     POST OPERATIVE VISIT INSTRUCTIONS - Visit #2    1. No soaking the incision for at least 7-10 days. You may get it wet in the shower and use regular soap.    2. To avoid a hard, painful scar, we recommend you use Mederma and/or Silicone Scar patches. You may also use Cocoa Butter, Vitamin E oil, Coconut oil, etc.    You may start this 5-7 days after stitches are removed and when wound is completely closed.    - Mederma scar cream: scar massage over incision 1-2 times per day. You may use topical lotion or Vitamin E oil. Massage an additional few times a day to help the scar become soft and less sensitive.    - Silicone Scar Patches: cut and place over the incision, then massage over the patch to help with scarring and sensitivity. Can be reused up to 10 days if you rinse it clean, let it dry out, then reapply.    Brands: Mepiform Silicone Scar Sheets (recommended),  Scar Away, [employer] brand or generic pharmacy brand ([facility], [facility], [facility])    3. Continue range of motion exercises as instructed at todays visit.    4. You may take Tylenol 500mg and/or Ibuprofen 400mg every 4-6 hours with food for pain as tolerated as long as you do not have an allergy or medical condition that prevents you from taking them.    5. Therapy recommended: none at this time / home exercises    6. Immobilization: brace as needed    7. Lifting restrictions: start light at about 10lb and increase gradually as tolerated    8. Follow up: SEVERIANO Valencia PA-C   [facility]

## 2023-09-29 ENCOUNTER — OFFICE VISIT (OUTPATIENT)
Dept: ORTHOPEDICS | Facility: CLINIC | Age: 28
End: 2023-09-29
Payer: COMMERCIAL

## 2023-09-29 VITALS — BODY MASS INDEX: 41.54 KG/M2 | WEIGHT: 220 LBS | HEIGHT: 61 IN

## 2023-09-29 DIAGNOSIS — Z98.890 S/P EXCISION OF GANGLION CYST: Primary | ICD-10-CM

## 2023-09-29 PROCEDURE — 99024 POSTOP FOLLOW-UP VISIT: CPT | Mod: S$GLB,,,

## 2023-09-29 PROCEDURE — 1159F PR MEDICATION LIST DOCUMENTED IN MEDICAL RECORD: ICD-10-PCS | Mod: CPTII,S$GLB,,

## 2023-09-29 PROCEDURE — 3008F PR BODY MASS INDEX (BMI) DOCUMENTED: ICD-10-PCS | Mod: CPTII,S$GLB,,

## 2023-09-29 PROCEDURE — 99024 PR POST-OP FOLLOW-UP VISIT: ICD-10-PCS | Mod: S$GLB,,,

## 2023-09-29 PROCEDURE — 3008F BODY MASS INDEX DOCD: CPT | Mod: CPTII,S$GLB,,

## 2023-09-29 PROCEDURE — 3044F PR MOST RECENT HEMOGLOBIN A1C LEVEL <7.0%: ICD-10-PCS | Mod: CPTII,S$GLB,,

## 2023-09-29 PROCEDURE — 99999 PR PBB SHADOW E&M-EST. PATIENT-LVL III: ICD-10-PCS | Mod: PBBFAC,,,

## 2023-09-29 PROCEDURE — 99999 PR PBB SHADOW E&M-EST. PATIENT-LVL III: CPT | Mod: PBBFAC,,,

## 2023-09-29 PROCEDURE — 3044F HG A1C LEVEL LT 7.0%: CPT | Mod: CPTII,S$GLB,,

## 2023-09-29 PROCEDURE — 1159F MED LIST DOCD IN RCRD: CPT | Mod: CPTII,S$GLB,,

## 2023-11-08 ENCOUNTER — HOSPITAL ENCOUNTER (OUTPATIENT)
Dept: RADIOLOGY | Facility: HOSPITAL | Age: 28
Discharge: HOME | End: 2023-11-08
Attending: FAMILY MEDICINE
Payer: COMMERCIAL

## 2023-11-08 ENCOUNTER — OFFICE VISIT (OUTPATIENT)
Dept: INTERNAL MEDICINE | Facility: CLINIC | Age: 28
End: 2023-11-08
Payer: COMMERCIAL

## 2023-11-08 VITALS
DIASTOLIC BLOOD PRESSURE: 68 MMHG | SYSTOLIC BLOOD PRESSURE: 110 MMHG | TEMPERATURE: 98 F | HEIGHT: 61 IN | WEIGHT: 219.56 LBS | OXYGEN SATURATION: 99 % | HEART RATE: 83 BPM | BODY MASS INDEX: 41.45 KG/M2

## 2023-11-08 DIAGNOSIS — M54.9 UPPER BACK PAIN: ICD-10-CM

## 2023-11-08 DIAGNOSIS — L21.9 SEBORRHEIC DERMATITIS: ICD-10-CM

## 2023-11-08 DIAGNOSIS — R73.03 PRE-DIABETES: ICD-10-CM

## 2023-11-08 DIAGNOSIS — M25.632 STIFF WRIST, LEFT: ICD-10-CM

## 2023-11-08 DIAGNOSIS — M54.2 NECK PAIN: ICD-10-CM

## 2023-11-08 DIAGNOSIS — D50.9 IRON DEFICIENCY ANEMIA, UNSPECIFIED IRON DEFICIENCY ANEMIA TYPE: Primary | ICD-10-CM

## 2023-11-08 PROCEDURE — 99214 OFFICE O/P EST MOD 30 MIN: CPT | Mod: S$GLB,,, | Performed by: FAMILY MEDICINE

## 2023-11-08 PROCEDURE — 72070 X-RAY EXAM THORAC SPINE 2VWS: CPT | Mod: 26,,, | Performed by: RADIOLOGY

## 2023-11-08 PROCEDURE — 72070 X-RAY EXAM THORAC SPINE 2VWS: CPT | Mod: TC,FY,PO

## 2023-11-08 PROCEDURE — 3074F SYST BP LT 130 MM HG: CPT | Mod: CPTII,S$GLB,, | Performed by: FAMILY MEDICINE

## 2023-11-08 PROCEDURE — 3074F PR MOST RECENT SYSTOLIC BLOOD PRESSURE < 130 MM HG: ICD-10-PCS | Mod: CPTII,S$GLB,, | Performed by: FAMILY MEDICINE

## 2023-11-08 PROCEDURE — 1160F RVW MEDS BY RX/DR IN RCRD: CPT | Mod: CPTII,S$GLB,, | Performed by: FAMILY MEDICINE

## 2023-11-08 PROCEDURE — 1159F PR MEDICATION LIST DOCUMENTED IN MEDICAL RECORD: ICD-10-PCS | Mod: CPTII,S$GLB,, | Performed by: FAMILY MEDICINE

## 2023-11-08 PROCEDURE — 99214 PR OFFICE/OUTPT VISIT, EST, LEVL IV, 30-39 MIN: ICD-10-PCS | Mod: S$GLB,,, | Performed by: FAMILY MEDICINE

## 2023-11-08 PROCEDURE — 3008F PR BODY MASS INDEX (BMI) DOCUMENTED: ICD-10-PCS | Mod: CPTII,S$GLB,, | Performed by: FAMILY MEDICINE

## 2023-11-08 PROCEDURE — 3008F BODY MASS INDEX DOCD: CPT | Mod: CPTII,S$GLB,, | Performed by: FAMILY MEDICINE

## 2023-11-08 PROCEDURE — 72040 XR CERVICAL SPINE 2 OR 3 VIEWS: ICD-10-PCS | Mod: 26,,, | Performed by: RADIOLOGY

## 2023-11-08 PROCEDURE — 3078F DIAST BP <80 MM HG: CPT | Mod: CPTII,S$GLB,, | Performed by: FAMILY MEDICINE

## 2023-11-08 PROCEDURE — 3078F PR MOST RECENT DIASTOLIC BLOOD PRESSURE < 80 MM HG: ICD-10-PCS | Mod: CPTII,S$GLB,, | Performed by: FAMILY MEDICINE

## 2023-11-08 PROCEDURE — 72040 X-RAY EXAM NECK SPINE 2-3 VW: CPT | Mod: TC,FY,PO

## 2023-11-08 PROCEDURE — 99999 PR PBB SHADOW E&M-EST. PATIENT-LVL V: CPT | Mod: PBBFAC,,, | Performed by: FAMILY MEDICINE

## 2023-11-08 PROCEDURE — 99999 PR PBB SHADOW E&M-EST. PATIENT-LVL V: ICD-10-PCS | Mod: PBBFAC,,, | Performed by: FAMILY MEDICINE

## 2023-11-08 PROCEDURE — 1160F PR REVIEW ALL MEDS BY PRESCRIBER/CLIN PHARMACIST DOCUMENTED: ICD-10-PCS | Mod: CPTII,S$GLB,, | Performed by: FAMILY MEDICINE

## 2023-11-08 PROCEDURE — 72040 X-RAY EXAM NECK SPINE 2-3 VW: CPT | Mod: 26,,, | Performed by: RADIOLOGY

## 2023-11-08 PROCEDURE — 3044F PR MOST RECENT HEMOGLOBIN A1C LEVEL <7.0%: ICD-10-PCS | Mod: CPTII,S$GLB,, | Performed by: FAMILY MEDICINE

## 2023-11-08 PROCEDURE — 3044F HG A1C LEVEL LT 7.0%: CPT | Mod: CPTII,S$GLB,, | Performed by: FAMILY MEDICINE

## 2023-11-08 PROCEDURE — 72070 XR THORACIC SPINE AP LATERAL: ICD-10-PCS | Mod: 26,,, | Performed by: RADIOLOGY

## 2023-11-08 PROCEDURE — 1159F MED LIST DOCD IN RCRD: CPT | Mod: CPTII,S$GLB,, | Performed by: FAMILY MEDICINE

## 2023-11-08 RX ORDER — KETOCONAZOLE 20 MG/ML
SHAMPOO, SUSPENSION TOPICAL
Qty: 120 ML | Refills: 5 | Status: SHIPPED | OUTPATIENT
Start: 2023-11-08

## 2023-11-08 RX ORDER — FLUOCINOLONE ACETONIDE 0.11 MG/ML
OIL TOPICAL
Qty: 118 ML | Refills: 5 | Status: SHIPPED | OUTPATIENT
Start: 2023-11-08

## 2023-11-08 NOTE — PROGRESS NOTES
Subjective     Patient ID: Main Aguilar is a 28 y.o. female.    Chief Complaint: Follow-up    Patient s/p ganglion cyst removal by Dr. Camarena in September. Patient states she is having decreased extension and flexion in the right wrist since surgery. She states she did perform the recommended exercises but not as often as instructed.  She has not informed ortho of the issue she is having.  Patient reports when sits up straight or sways back from side to side or head maneuvering she feels her spine pop. 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 She is having back discomfort and tightness as well. Patient also is in need of labs to follow up on her anemia. Patient reports feeling bad after taking         Review of Systems   Constitutional: Negative.    HENT: Negative.     Eyes:  Negative for discharge and redness.   Respiratory: Negative.     Cardiovascular: Negative.  Negative for palpitations and leg swelling.   Gastrointestinal: Negative.  Negative for constipation and diarrhea.   Musculoskeletal:  Positive for back pain. Negative for gait problem.   Neurological: Negative.  Negative for coordination difficulties.   Psychiatric/Behavioral: Negative.            Objective     Physical Exam  Vitals and nursing note reviewed.   Constitutional:       Appearance: Normal appearance. She is obese.   HENT:      Head: Normocephalic and atraumatic.   Eyes:      Extraocular Movements: Extraocular movements intact.   Cardiovascular:      Rate and Rhythm: Normal rate and regular rhythm.      Pulses: Normal pulses.      Heart sounds: Normal heart sounds.   Pulmonary:      Effort: Pulmonary effort is normal.      Breath sounds: Normal breath sounds.   Abdominal:      General: Abdomen is flat. Bowel sounds are normal.      Palpations: Abdomen is soft.   Musculoskeletal:      Cervical back: Normal range of motion and neck supple.   Skin:     General: Skin is warm and dry.   Neurological:       General: No focal deficit present.      Mental Status: She is alert and oriented to person, place, and time.   Psychiatric:         Mood and Affect: Mood normal.         Behavior: Behavior normal.          Assessment and Plan     1. Iron deficiency anemia, unspecified iron deficiency anemia type  Comments:  take iron in the morning. do not take at the same time as metformin  Orders:  -     CBC W/ AUTO DIFFERENTIAL; Future; Expected date: 11/08/2023  -     IRON AND TIBC; Future; Expected date: 11/08/2023  -     FERRITIN; Future; Expected date: 11/08/2023  -     COMPREHENSIVE METABOLIC PANEL; Future; Expected date: 11/08/2023    2. Seborrheic dermatitis  Comments:  medication refilled  Orders:  -     ketoconazole (NIZORAL) 2 % shampoo; Wash hair with medicated shampoo at least 1x/week - let sit on scalp at least 5 minutes prior to rinsing  Dispense: 120 mL; Refill: 5  -     fluocinolone (DERMA-SMOOTHE) 0.01 % external oil; Apply oil to scalp once to twice a day as needed for dryness  Dispense: 118 mL; Refill: 5    3. Pre-diabetes  Comments:  continue taking once daily will try extended release if still cannot tolerate with iron  Orders:  -     HEMOGLOBIN A1C; Future; Expected date: 11/08/2023  -     Insulin, random; Future; Expected date: 11/08/2023    4. Neck pain  Comments:  xray and refer to pt, discussed appropriate body mechanics  Orders:  -     X-Ray Cervical Spine 2 or 3 Views; Future; Expected date: 11/08/2023  -     Ambulatory referral/consult to Physical/Occupational Therapy; Future; Expected date: 11/15/2023    5. Upper back pain  Comments:  xray and refer to pt, discussed appropriate body mechanics  Orders:  -     Cancel: X-Ray Thoracic Spine 4 or more views; Future; Expected date: 11/08/2023  -     Ambulatory referral/consult to Physical/Occupational Therapy; Future; Expected date: 11/15/2023    6. Stiff wrist, left  Comments:  decreased flexion and extension. messaged Dr Camarena regarding follow up for  patient               Follow up in about 8 weeks (around 1/3/2024).

## 2023-11-10 ENCOUNTER — TELEPHONE (OUTPATIENT)
Dept: HEMATOLOGY/ONCOLOGY | Facility: CLINIC | Age: 28
End: 2023-11-10

## 2023-11-10 ENCOUNTER — CLINICAL SUPPORT (OUTPATIENT)
Dept: REHABILITATION | Facility: HOSPITAL | Age: 28
End: 2023-11-10
Payer: COMMERCIAL

## 2023-11-10 DIAGNOSIS — M54.2 NECK PAIN: ICD-10-CM

## 2023-11-10 DIAGNOSIS — M54.9 UPPER BACK PAIN: ICD-10-CM

## 2023-11-10 PROCEDURE — 97112 NEUROMUSCULAR REEDUCATION: CPT | Mod: PN

## 2023-11-10 PROCEDURE — 97161 PT EVAL LOW COMPLEX 20 MIN: CPT | Mod: PN

## 2023-11-10 PROCEDURE — 97140 MANUAL THERAPY 1/> REGIONS: CPT | Mod: PN

## 2023-11-10 NOTE — PROGRESS NOTES
[facility]  Physical Therapy Initial Evaluation    Name: Main Aguilar  Clinic Number: [id number]    Therapy Diagnosis:   Encounter Diagnoses   Name Primary?    Neck pain     Upper back pain      Physician: Carloz Huang MD    Physician Orders: PT Eval and Treat     Medical Diagnosis from Referral:   M54.2 (ICD-10-CM) - Neck pain   M54.9 (ICD-10-CM) - Upper back pain     Evaluation Date: 11/10/2023  Authorization Period Expiration: 12/31/23  Plan of Care Expiration: 1/18/24  Visit # / Visits authorized: 1/ 1  Foto: 1/3    Precautions: Standard, pre diabetic, PCOS    Time In: 9:20 am  Time Out: 10:30 am  Total Time: 70 minutes      Subjective   Date of onset: a few months ago  History of current condition - Main reports: neck and upper back pain after lifting weights in the gym. Pt likes to work out but has adjusted her tasks to accommodate.     Pain:  Current 2/10, worst 4/10, best 2/10   Location: neck to upper back,, headaches  Description: sore ache, R scapular medial border pain  Aggravating Factors:retraction tasks  Easing Factors: laying on her back    Prior Therapy: no  Social History: lives home with spouse  Occupation: telecommunications for the deaf population  Prior Level of Function: working out regularly  Current Level of Function: limited in the gym    Imaging: see chart for details    Medical History:   Past Medical History:   Diagnosis Date    Asthma     Nontoxic single thyroid nodule     PCOS (polycystic ovarian syndrome)     Prediabetes     Seborrheic dermatitis        Surgical History:   Main Aguilar  has a past surgical history that includes Excision of ganglion of wrist (Right, 9/11/2023).    Medications:   Main has a current medication list which includes the following prescription(s): fluocinolone, iron-vitamin c 100-250 mg (icar-c), ketoconazole, ketoconazole, metformin, and triamcinolone acetonide 0.025%.    Allergies:    Review of patient's allergies indicates:   Allergen Reactions    Lactase Rash        Pts goals: get back to the gym with less pain    Objective       CMS Impairment/Limitation/Restriction for FOTO neck Survey    Therapist reviewed FOTO scores for Main Aguilar on 11/10/2023.   FOTO documents entered into [employer] - see Media section.    Functional Score: ***         Posture: Pt noted to present with moderate forward head/rounded shoulder posture.  B scapula elevated and protracted a lot. Reduce T spine kyphosis.     Shoulder ROM:  Full ROM in all planes B    Pain with motions of cervical flexion.    Cervical Spine AROM:   Degrees Pain   Flexion 50 y   Extend 60 n   R side bend 30    L side bend 30    R rotation 70 n   L rotation 80 n     T spine rotation R/L seated full but in open book 90 to the R and 80 % L  Strength:  Muscle (Myotome) Right Left   Cervical Deep neck Flexor hold time  hold 15 sec/32 sec    Shoulder Abduction 5 5   Elbow Flexors (C5) 5 5   Wrist Extensors (C6) 5 5   Elbow Extensors (C7) 5 5   ER (arm at side) 4+ 4+   IR (arm at side) 5 5   Thumb extensors 5 5   Intrinsic strength 5 5   Mid traps 3+/5; lower 2+/5    Sensation: Intact in Ue's B      Special Test:  Quadrant test flexion +  Upper Limb Tension Test: radial, median and ulnar WFL    Palpation:  tight suboccipitals and upper traps  PA to the mid to upper T spine sharp pain and stiff in lower to mid T spine  Ribs 2 stiff B  Scapular medially wing and protract B    TREATMENT   Treatment Time In: 9:45 am  Treatment Time Out: 10:25 am  Total Treatment time separate from Evaluation: 40 minutes    Main received the following manual therapy techniques: Joint mobilizations, Manual traction, and Soft tissue Mobilization were applied to the: neck and mid back for 15 minutes, including:  Suboccipital release  PA to the mid and lower T spine grade II  Manual cervical traction    Main participated in neuromuscular re-education  activities to improve: Balance, Kinesthetic, Proprioception, and Posture for 25 minutes. The following activities were included:    Posterior pelvic tilt 2x 10  Deep neck flexor hold x 5  Supine cervical retraction 1x 15  Supine cervical retraction with chin tuck 1x 15  Prone T with scapular depression and retraction 2x 10 B  Open book x 3 with exhaling at EROM  Qped alternating arm with transverse abdominus activation    Home Exercises and Patient Education Provided    Education provided:   -Education on condition, HEP, and PT educated pt in nutrition to help increase fiber to reduce sugar to aid in reducing inflammation as well as nutrition for fuel for exercise and daily tasks. PT educated pt in the need to increase conditioning the mid and lower traps to advance neck pain relief.       Written Home Exercises Provided: Patient instructed to cont prior HEP.  Exercises were reviewed and Main was able to demonstrate them prior to the end of the session.  Main demonstrated good  understanding of the education provided.     See EMR under Patient Instructions for exercises provided 11/10/2023.    Assessment   Main is a 28 y.o. female referred to outpatient Physical Therapy with a medical diagnosis of   M54.2 (ICD-10-CM) - Neck pain   M54.9 (ICD-10-CM) - Upper back pain   , pt presents with signs and symptoms consistent with diagnosis along with neck ROM loss, neck pain, decreased functional mobility and endurance. The patient presents with impairments which include impairments list: ROM, strength, endurance, joint mobility, muscle length, balance, posture, gait mechanics, core strength and stability, and functional movement patterns.  These impairments are limiting patient's ability to work out, perform work tasks seated.     Pt prognosis is Good due to personal factors and co-morbidities listed below.   Pt will benefit from skilled outpatient Physical Therapy to address the deficits stated above and  in the chart below, provide pt/family education, and to maximize pt's level of independence.     Plan of care discussed with patient: Yes  Pt's spiritual, cultural and educational needs considered and patient is agreeable to the plan of care and goals as stated below:     Anticipated Barriers for therapy: none      Medical Necessity is demonstrated by the following  History  Co-morbidities and personal factors that may impact the plan of care [x] LOW: no personal factors / co-morbidities  [] MODERATE: 1-2 personal factors / co-morbidities  [] HIGH: 3+ personal factors / co-morbidities    Moderate / High Support Documentation:   Co-morbidities affecting plan of care: na    Personal Factors:   no deficits     Examination  Body Structures and Functions, activity limitations and participation restrictions that may impact the plan of care [x] LOW: addressing 1-2 elements  [] MODERATE: 3+ elements  [] HIGH: 4+ elements (please support below)    Moderate / High Support Documentation: na     Clinical Presentation [x] LOW: stable  [] MODERATE: Evolving  [] HIGH: Unstable     Decision Making/ Complexity Score: low         Goals:  Short Term Goals: In 4 weeks   1.Pt to be educated on HEP.  2.Patient to increase cervical flexion to 60 degrees without sharp pain  3.Patient to increase strength to 4/5 in mid traps.  4.Patient to have pain less than 2/10 or better at all times.  5.Patient to improve score on the FOTO by 10%.  6. Pt to be educated on postural and body mechanics awareness.    Long Term Goals: In 10 weeks 1/18/24  1. Patient to improve score on the FOTO to ***.  2. Patient to demo increase in UE strength to 3+/5 in lower traps  3. Patient to perform daily activities including dead lifting up 65# or better and lifting up to 20 # overhead or better.      Plan   Plan of care Certification: 11/10/2023 to 1/18/24.    Outpatient Physical Therapy 2 times weekly for 10 weeks to include the following interventions:  Cervical/Lumbar Traction, Electrical Stimulation IFC, NMES, Gait Training, Manual Therapy, Moist Heat/ Ice, Neuromuscular Re-ed, Patient Education, Self Care, Therapeutic Activities, Therapeutic Exercise, and DN .     Radha Dickerson, PT, CIDN, SFMA    Thank you for this referral.    These services are reasonable and necessary for the conditions set forth above while under my care.

## 2023-11-10 NOTE — TELEPHONE ENCOUNTER
Nurse spoke with pt in regards to rescheduling her missed appt. Pt has been rescheduled. Verbalized understanding of changes.

## 2023-11-10 NOTE — PLAN OF CARE
[facility]  Physical Therapy Initial Evaluation    Name: Main Aguilar  Clinic Number: [id number]    Therapy Diagnosis:   Encounter Diagnoses   Name Primary?    Neck pain     Upper back pain      Physician: Carloz Huang MD    Physician Orders: PT Eval and Treat     Medical Diagnosis from Referral:   M54.2 (ICD-10-CM) - Neck pain   M54.9 (ICD-10-CM) - Upper back pain     Evaluation Date: 11/10/2023  Authorization Period Expiration: 12/31/23  Plan of Care Expiration: 1/18/24  Visit # / Visits authorized: 1/ 1  Foto: 1/3    Precautions: Standard, pre diabetic, PCOS    Time In: 9:20 am  Time Out: 10:30 am  Total Time: 70 minutes      Subjective   Date of onset: a few months ago  History of current condition - Main reports: neck and upper back pain after lifting weights in the gym. Pt likes to work out but has adjusted her tasks to accommodate.     Pain:  Current 2/10, worst 4/10, best 2/10   Location: neck to upper back,, headaches  Description: sore ache, R scapular medial border pain  Aggravating Factors:retraction tasks  Easing Factors: laying on her back    Prior Therapy: no  Social History: lives home with spouse  Occupation: telecommunications for the deaf population  Prior Level of Function: working out regularly  Current Level of Function: limited in the gym    Imaging: see chart for details    Medical History:   Past Medical History:   Diagnosis Date    Asthma     Nontoxic single thyroid nodule     PCOS (polycystic ovarian syndrome)     Prediabetes     Seborrheic dermatitis        Surgical History:   Main Aguilar  has a past surgical history that includes Excision of ganglion of wrist (Right, 9/11/2023).    Medications:   Main has a current medication list which includes the following prescription(s): fluocinolone, iron-vitamin c 100-250 mg (icar-c), ketoconazole, ketoconazole, metformin, and triamcinolone acetonide 0.025%.    Allergies:    Review of patient's allergies indicates:   Allergen Reactions    Lactase Rash        Pts goals: get back to the gym with less pain    Objective       CMS Impairment/Limitation/Restriction for FOTO neck Survey    Therapist reviewed FOTO scores for Main Aguilar on 11/10/2023.   FOTO documents entered into [employer] - see Media section.    Functional Score: 63         Posture: Pt noted to present with moderate forward head/rounded shoulder posture.  B scapula elevated and protracted a lot. Reduce T spine kyphosis.     Shoulder ROM:  Full ROM in all planes B    Pain with motions of cervical flexion.    Cervical Spine AROM:   Degrees Pain   Flexion 50 y   Extend 60 n   R side bend 30    L side bend 30    R rotation 70 n   L rotation 80 n     T spine rotation R/L seated full but in open book 90 to the R and 80 % L  Strength:  Muscle (Myotome) Right Left   Cervical Deep neck Flexor hold time  hold 15 sec/32 sec    Shoulder Abduction 5 5   Elbow Flexors (C5) 5 5   Wrist Extensors (C6) 5 5   Elbow Extensors (C7) 5 5   ER (arm at side) 4+ 4+   IR (arm at side) 5 5   Thumb extensors 5 5   Intrinsic strength 5 5   Mid traps 3+/5; lower 2+/5    Sensation: Intact in Ue's B      Special Test:  Quadrant test flexion +  Upper Limb Tension Test: radial, median and ulnar WFL    Palpation:  tight suboccipitals and upper traps  PA to the mid to upper T spine sharp pain and stiff in lower to mid T spine  Ribs 2 stiff B  Scapular medially wing and protract B    TREATMENT   Treatment Time In: 9:45 am  Treatment Time Out: 10:25 am  Total Treatment time separate from Evaluation: 40 minutes    Main received the following manual therapy techniques: Joint mobilizations, Manual traction, and Soft tissue Mobilization were applied to the: neck and mid back for 15 minutes, including:  Suboccipital release  PA to the mid and lower T spine grade II  Manual cervical traction    aMin participated in neuromuscular re-education  activities to improve: Balance, Kinesthetic, Proprioception, and Posture for 25 minutes. The following activities were included:    Posterior pelvic tilt 2x 10  Deep neck flexor hold x 5  Supine cervical retraction 1x 15  Supine cervical retraction with chin tuck 1x 15  Prone T with scapular depression and retraction 2x 10 B  Open book x 3 with exhaling at EROM  Qped alternating arm with transverse abdominus activation    Home Exercises and Patient Education Provided    Education provided:   -Education on condition, HEP, and PT educated pt in nutrition to help increase fiber to reduce sugar to aid in reducing inflammation as well as nutrition for fuel for exercise and daily tasks. PT educated pt in the need to increase conditioning the mid and lower traps to advance neck pain relief.       Written Home Exercises Provided: Patient instructed to cont prior HEP.  Exercises were reviewed and Main was able to demonstrate them prior to the end of the session.  Main demonstrated good  understanding of the education provided.     See EMR under Patient Instructions for exercises provided 11/10/2023.    Assessment   Main is a 28 y.o. female referred to outpatient Physical Therapy with a medical diagnosis of   M54.2 (ICD-10-CM) - Neck pain   M54.9 (ICD-10-CM) - Upper back pain   , pt presents with signs and symptoms consistent with diagnosis along with neck ROM loss, neck pain, decreased functional mobility and endurance. The patient presents with impairments which include impairments list: ROM, strength, endurance, joint mobility, muscle length, balance, posture, gait mechanics, core strength and stability, and functional movement patterns.  These impairments are limiting patient's ability to work out, perform work tasks seated.     Pt prognosis is Good due to personal factors and co-morbidities listed below.   Pt will benefit from skilled outpatient Physical Therapy to address the deficits stated above and  in the chart below, provide pt/family education, and to maximize pt's level of independence.     Plan of care discussed with patient: Yes  Pt's spiritual, cultural and educational needs considered and patient is agreeable to the plan of care and goals as stated below:     Anticipated Barriers for therapy: none      Medical Necessity is demonstrated by the following  History  Co-morbidities and personal factors that may impact the plan of care [x] LOW: no personal factors / co-morbidities  [] MODERATE: 1-2 personal factors / co-morbidities  [] HIGH: 3+ personal factors / co-morbidities    Moderate / High Support Documentation:   Co-morbidities affecting plan of care: na    Personal Factors:   no deficits     Examination  Body Structures and Functions, activity limitations and participation restrictions that may impact the plan of care [x] LOW: addressing 1-2 elements  [] MODERATE: 3+ elements  [] HIGH: 4+ elements (please support below)    Moderate / High Support Documentation: na     Clinical Presentation [x] LOW: stable  [] MODERATE: Evolving  [] HIGH: Unstable     Decision Making/ Complexity Score: low         Goals:  Short Term Goals: In 4 weeks   1.Pt to be educated on HEP.  2.Patient to increase cervical flexion to 60 degrees without sharp pain  3.Patient to increase strength to 4/5 in mid traps.  4.Patient to have pain less than 2/10 or better at all times.  5.Patient to improve score on the FOTO by 10%.  6. Pt to be educated on postural and body mechanics awareness.    Long Term Goals: In 10 weeks 1/18/24  1. Patient to improve score on the FOTO to 72.  2. Patient to demo increase in UE strength to 3+/5 in lower traps  3. Patient to perform daily activities including dead lifting up 65# or better and lifting up to 20 # overhead or better.      Plan   Plan of care Certification: 11/10/2023 to 1/18/24.    Outpatient Physical Therapy 2 times weekly for 10 weeks to include the following interventions:  Cervical/Lumbar Traction, Electrical Stimulation IFC, NMES, Gait Training, Manual Therapy, Moist Heat/ Ice, Neuromuscular Re-ed, Patient Education, Self Care, Therapeutic Activities, Therapeutic Exercise, and DN.     Radha Dickerson, PT, CIDN, SFMA    Thank you for this referral.    These services are reasonable and necessary for the conditions set forth above while under my care.

## 2023-11-10 NOTE — TELEPHONE ENCOUNTER
----- Message from Alfa Perez LPN sent at 11/10/2023 11:58 AM CST -----  Regarding: FW: NP  Contact: Main    ----- Message -----  From: Yoli Hudson  Sent: 11/10/2023  11:23 AM CST  To: [facility] Hematology/Oncology Scheduling Pool  Subject: NP                                               .Type:  Needs Medical Advice    Who Called:  Antaneshia  Symptoms (please be specific):    How long has patient had these symptoms:    Pharmacy name and phone #:    Would the patient rather a call back or a response via [facility]? call  Best Call Back Number: [phone] (home)    Additional Information:  Antaneshia need to reschedule please contact

## 2023-11-13 ENCOUNTER — CLINICAL SUPPORT (OUTPATIENT)
Dept: REHABILITATION | Facility: HOSPITAL | Age: 28
End: 2023-11-13
Payer: COMMERCIAL

## 2023-11-13 DIAGNOSIS — M54.89 INTERSCAPULAR PAIN: Primary | ICD-10-CM

## 2023-11-13 PROCEDURE — 97140 MANUAL THERAPY 1/> REGIONS: CPT | Mod: PN

## 2023-11-13 PROCEDURE — 97110 THERAPEUTIC EXERCISES: CPT | Mod: PN

## 2023-11-13 PROCEDURE — 97112 NEUROMUSCULAR REEDUCATION: CPT | Mod: PN

## 2023-11-13 NOTE — PROGRESS NOTES
Physical Therapy Daily Treatment Note     Name: Main Carlson [facility]  Clinic Number: [id number]    Therapy Diagnosis: No diagnosis found.  Physician: Carloz Huang*    Visit Date: 11/13/2023    Physician Orders: PT Eval and Treat    Medical Diagnosis from Referral:   M54.2 (ICD-10-CM) - Neck pain   M54.9 (ICD-10-CM) - Upper back pain      Evaluation Date: 11/10/2023  Authorization Period Expiration: 12/31/23  Plan of Care Expiration: 1/18/24  Visit # / Visits authorized: 1/ 1  Foto: 1/3     Precautions: Standard, pre diabetic, PCOS  Progress Note Due Date: due 30 days from 11/10/23      Time In: *** ampm   Time Out: ***ampm  Total Time: *** minutes    SUBJECTIVE     Today, pt reports: ***.  She {Actions; was/was not:69479} compliant with home exercise program.  Response to previous treatment: ***  Functional change: ***    Pre-Treatment Pain: ***10  Post-Treatment Pain: ***/10  Location: ***  TREATMENT     Main received therapeutic exercises to develop {AMB PT PROGRESS OBJECTIVE:55378} for *** minutes including:        Main received the following manual therapy techniques: {AMB PT PROGRESS MANUAL THERAPY:49120} were applied to the: *** for *** minutes, including:  STM of {RIGHT/LEFT:00651} {Muscle Palpation List:04055}      Main participated in neuromuscular re-education activities to improve: {AMB PT PROGRESS NEURO RE-ED:47203} for *** minutes. The following activities were included:    Main participated in dynamic functional therapeutic activities to improve functional performance for ***  minutes, including:          Main participated in gait training to improve functional mobility and safety for ***  minutes, including:      Main received the following direct contact modalities after being cleared for contraindications:     Main received the following supervised modalities after being cleared for contradictions:     Main received hot pack for ***  "minutes to ***.    Jefferyhia received cold pack for *** minutes to ***.  Jefferyhia received electrical stimulation for *** minutes to ***      Home Exercises Provided and Patient Education Provided     Education/Self-Care provided: (*** minutes)  Patient educated on biomechanical justification for therapeutic exercise and importance of compliance with HEP in order to improve overall impairments and QOL   Patient educated on postural awareness and the use of a lumbar roll when in a seated position to reduce stress and maintain optimal alignment of the spine.   Patient educated on proper ergonomics at the work station in order to maintain optimal alignment of the musculoskeletal system and improve efficiency in the work environment.  Patient educated on the importance of improved core and {upper/lower:24410:a:"upper","lower"} extremity strength in order to improve alignment of the spine and {upper/lower:22478:a:"upper","lower"} extremities with static positions and dynamic movement.   Patient educated on the importance of strong core and lower extremity musculature in order to improve both static and dynamic balance, improve gait mechanics, reduce fall risk and improve household and community mobility.       Written Home Exercises Provided: {Blank single:07869::"yes","Patient instructed to cont prior HEP"}.  Exercises were reviewed and Main was able to demonstrate them prior to the end of the session.  Main demonstrated {Desc; good/fair/poor:03820} understanding of the education provided.     See EMR under {Blank single:33177::"Media","Patient Instructions"} for exercises provided {Blank single:87636::"11/13/2023","prior visit"}.    ASSESSMENT   Pt tolerated therex manual therapy well with reports of decreased pain and tension in musculature following intervention. Pt tolerated exercise well with reports of increased fatigue but no increased pain. Pt demonstrated good understanding of exercises and " required minimal cueing to maintain proper form.  ***    Antaneshia {IS/IS NOT:75374} progressing well towards her goals.   Pt prognosis is {REHAB PROGNOSIS OHS:14754}.     Pt will continue to benefit from skilled outpatient physical therapy to address the deficits listed in the problem list box on initial evaluation, provide pt/family education and to maximize pt's level of independence in the home and community environment.     Pt's spiritual, cultural and educational needs considered and pt agreeable to plan of care and goals.     Anticipated barriers to physical therapy: ***    Goals:   Short Term Goals: In 4 weeks   1.Pt to be educated on HEP.  2.Patient to increase cervical flexion to 60 degrees without sharp pain  3.Patient to increase strength to 4/5 in mid traps.  4.Patient to have pain less than 2/10 or better at all times.  5.Patient to improve score on the FOTO by 10%.  6. Pt to be educated on postural and body mechanics awareness.     Long Term Goals: In 10 weeks 1/18/24  1. Patient to improve score on the FOTO to 72.  2. Patient to demo increase in UE strength to 3+/5 in lower traps  3. Patient to perform daily activities including dead lifting up 65# or better and lifting up to 20 # overhead or better.        Plan   Plan of care Certification: 11/10/2023 to 1/18/24.     Outpatient Physical Therapy 2 times weekly for 10 weeks to include the following interventions: Cervical/Lumbar Traction, Electrical Stimulation IFC, NMES, Gait Training, Manual Therapy, Moist Heat/ Ice, Neuromuscular Re-ed, Patient Education, Self Care, Therapeutic Activities, Therapeutic Exercise, and DN.      PLAN   Continue Plan of Care (POC) and progress per patient tolerance.    Radha Dickerson, PT, CIDN, SFMA

## 2023-11-13 NOTE — PROGRESS NOTES
"[facility]   Physical Therapy Treatment Note        Name: Main Carlson [facility]  Clinic Number: [id number]    Therapy Diagnosis: No diagnosis found.  Physician: Carloz Huang*    Visit Date: 11/13/2023    Physician: Carloz Huang, MD     Physician Orders: PT Eval and Treat      Medical Diagnosis from Referral:   M54.2 (ICD-10-CM) - Neck pain   M54.9 (ICD-10-CM) - Upper back pain      Evaluation Date: 11/10/2023  Authorization Period Expiration: 12/31/23  Plan of Care Expiration: 1/18/24  Visit # / Visits authorized: 1/ 1  Foto: 1/3     Precautions: Standard, pre diabetic, PCOS  PTA Visit #: 0/5     Time In: 915  Time Out: 1000  Total Billable Time: 45 minutes (Billing reflects 1 on 1 treatment time spent with patient)    Subjective     Patient reports: that she is still having mid back pain and cervical pain she was having at her initial eval but less in intensity.    He/She was compliant with home exercise program.  Response to previous treatment: no notable change  Functional change: no notable change    Pain: 0/10     Location: mid back cervical spine    Objective      Objective Measures updated at progress report or POC update only unless otherwise noted.       Treatment     Main received the treatments listed below:     MANUAL THERAPY TECHNIQUES were applied for (8) minutes, including:    Cervical upglides and downglides bilaterally  Thoracic mid manipulations     THERAPEUTIC EXERCISES to develop strength, endurance, ROM, flexibility, posture, and core stabilization for (12) minutes including:    UBE 3' fwd/back  SL thoracic rotation 2" hold 15x B  Prayer stretch 30" 3x  Chin tuck 5x DC due to pain    NEUROMUSCULAR RE-EDUCATION ACTIVITIES to improve Balance, Coordination, Kinesthetic, Sense, Proprioception, and Posture for (23) minutes.  The following were included:    Plank 20" hold 5x  Cable Rows 30# 1x10 DC (due to pain)  Cable Thoracic rotation right " side 4x5 20#      Patient Education and Home Exercises       Home Exercises Provided and Patient Education Provided     Education provided: (5) minutes  PURPOSE: Patient educated on the impairments noted above and the effects of physical therapy intervention to improve overall condition and QOL.     Written Home Exercises Provided: yes.  Exercises were reviewed and Main was able to demonstrate them prior to the end of the session.  Main demonstrated good  understanding of the education provided. See EMR under Patient Instructions for exercises provided during therapy sessions.    Assessment     Pt tolerated today's session well. Pt had cerivcal pain with chin tucks and mid back pain with rows and were withheld. Pt's session focused on thoracic mobility and core stabilization to improve pt's function. Pt will continue to be progressed as tolerated.    Main is progressing well towards her goals.   Pt prognosis is Excellent.     Pt will continue to benefit from skilled outpatient physical therapy to address the deficits listed in the problem list box on initial evaluation, provide pt/family education and to maximize pt's level of independence in the home and community environment.     Pt's spiritual, cultural and educational needs considered and pt agreeable to plan of care and goals.     Anticipated Barriers for therapy: none    Goals:  Short Term Goals: In 4 weeks   1.Pt to be educated on HEP.  2.Patient to increase cervical flexion to 60 degrees without sharp pain  3.Patient to increase strength to 4/5 in mid traps.  4.Patient to have pain less than 2/10 or better at all times.  5.Patient to improve score on the FOTO by 10%.  6. Pt to be educated on postural and body mechanics awareness.     Long Term Goals: In 10 weeks 1/18/24  1. Patient to improve score on the FOTO to 72.  2. Patient to demo increase in UE strength to 3+/5 in lower traps  3. Patient to perform daily activities including dead  lifting up 65# or better and lifting up to 20 # overhead or better.    Plan     Continue Plan of Care (POC) and progress per patient tolerance. See treatment section for details on planned progressions next session.      John Odom, PT

## 2023-11-15 ENCOUNTER — CLINICAL SUPPORT (OUTPATIENT)
Dept: REHABILITATION | Facility: HOSPITAL | Age: 28
End: 2023-11-15
Payer: COMMERCIAL

## 2023-11-15 DIAGNOSIS — M54.9 UPPER BACK PAIN: ICD-10-CM

## 2023-11-15 DIAGNOSIS — M54.89 INTERSCAPULAR PAIN: Primary | ICD-10-CM

## 2023-11-15 DIAGNOSIS — M54.2 NECK PAIN: ICD-10-CM

## 2023-11-15 PROCEDURE — 97110 THERAPEUTIC EXERCISES: CPT | Mod: PN

## 2023-11-15 PROCEDURE — 97112 NEUROMUSCULAR REEDUCATION: CPT | Mod: PN

## 2023-11-15 NOTE — PROGRESS NOTES
"[facility]   Physical Therapy Treatment Note        Name: Main Carlson [facility]  Clinic Number: [id number]    Therapy Diagnosis:   Encounter Diagnoses   Name Primary?    Interscapular pain Yes    Neck pain     Upper back pain      Physician: Sandra Huang MD    Visit Date: 11/15/2023     Physician Orders: PT Eval and Treat      Medical Diagnosis from Referral:   M54.2 (ICD-10-CM) - Neck pain   M54.9 (ICD-10-CM) - Upper back pain      Evaluation Date: 11/10/2023  Authorization Period Expiration: 12/31/23  Plan of Care Expiration: 1/18/24  Visit # / Visits authorized: 3/ 10  Foto: 1/3     Precautions: Standard, pre diabetic, PCOS  PTA Visit #: 0/5     Time In: 9:19 am  Time Out: 10: 20  Total Billable Time: 61 minutes  with tech assistance  Subjective     Patient reports: that she felt a lot of pain on the seated row on the last session. Pt states she feels arms and L anterior thigh gets "numb" with this task and performing W at times.  She was compliant with home exercise program.  Response to previous treatment: no notable change  Functional change: no notable change    Pain: 5/10     Location: mid back cervical spine    Objective      Objective Measures updated at progress report or POC update only unless otherwise noted.       Treatment     Main received the treatments listed below:     MANUAL THERAPY TECHNIQUES were applied for (0) minutes, including:    Deferred:  Cervical upglides and downglides bilaterally  Thoracic mid manipulations     THERAPEUTIC EXERCISES to develop strength, endurance, ROM, flexibility, posture, and core stabilization for (30) minutes including:    Trunk rotation setting 3 20 # 3x 10  Trunk extension machine 60 # 1x 15; 70# 1x15  Seated rows with scapular retract/protract (reproduces tingling at times but not in prone when head is supported) 25# 3x 10  Seated overhead shrugs 3x 10 (occasional tingling ; T4 syndrome like behaviors seem present " "so PT to monitor)    Deferred:  UBE 3' fwd/back  SL thoracic rotation 2" hold 15x B  Prayer stretch 30" 3x  Chin tuck 5x DC due to pain    NEUROMUSCULAR RE-EDUCATION ACTIVITIES to improve Balance, Coordination, Kinesthetic, Sense, Proprioception, and Posture for (31) minutes.  The following were included:  Posterior pelvic tilt 2x 10  Supine chin tuck with retraction (pressure stretch felt in posterior neck and consistent with expectations)  Supine head flexion 2x 10  Prone scapular retraction/depression 2x 10  Prone T 3x 10  Prone Y 3x 10  Shoulder extension 2# 3x10  Shoulder rows 4# 3x 10      Deferred:  Seated scapular retraction  Plank 20" hold 5x  Cable Rows 30# 1x10 DC (due to pain)  Cable Thoracic rotation right side 4x5 20#      Patient Education and Home Exercises       Home Exercises Provided and Patient Education Provided     Education provided: (during therex)  PURPOSE: Patient educated on the impairments noted above and the effects of physical therapy intervention to improve overall condition and QOL.   Eduated pt on what is normal feelings with therex vs abnormal.    Written Home Exercises Provided: yes.  Exercises were reviewed and Main was able to demonstrate them prior to the end of the session.  Main demonstrated good  understanding of the education provided. See EMR under Patient Instructions for exercises provided during therapy sessions.    Assessment     Pt tolerated today's session well with PT educating pt in the difference in pain, tingling, pressure and stretching and what is expected during therex. In supine and prone she has no tingling as the neck is more supported but tingling is present when seated with some similar tasks such as the W so PT to monitor and advance with neural tension relieving tasks as able. Pt reports her pain is not painful but stiff, pressure or sore from fatigue of therex.  Pt will continue to be progressed as tolerated.    Main is progressing " well towards her goals.   Pt prognosis is Excellent.     Pt will continue to benefit from skilled outpatient physical therapy to address the deficits listed in the problem list box on initial evaluation, provide pt/family education and to maximize pt's level of independence in the home and community environment.     Pt's spiritual, cultural and educational needs considered and pt agreeable to plan of care and goals.     Anticipated Barriers for therapy: none    Goals:  Short Term Goals: In 4 weeks   1.Pt to be educated on HEP. progressing  2.Patient to increase cervical flexion to 60 degrees without sharp pain  3.Patient to increase strength to 4/5 in mid traps.  4.Patient to have pain less than 2/10 or better at all times.  5.Patient to improve score on the FOTO by 10%.  6. Pt to be educated on postural and body mechanics awareness.     Long Term Goals: In 10 weeks 1/18/24  1. Patient to improve score on the FOTO to 72.  2. Patient to demo increase in UE strength to 3+/5 in lower traps  3. Patient to perform daily activities including dead lifting up 65# or better and lifting up to 20 # overhead or better.    Plan     Continue Plan of Care (POC) and progress per patient tolerance. See treatment section for details on planned progressions next session.      Radha Dickerson, PT

## 2023-11-17 ENCOUNTER — OFFICE VISIT (OUTPATIENT)
Dept: HEMATOLOGY/ONCOLOGY | Facility: CLINIC | Age: 28
End: 2023-11-17
Payer: COMMERCIAL

## 2023-11-17 VITALS
HEIGHT: 61 IN | TEMPERATURE: 98 F | SYSTOLIC BLOOD PRESSURE: 121 MMHG | WEIGHT: 216.81 LBS | DIASTOLIC BLOOD PRESSURE: 76 MMHG | HEART RATE: 78 BPM | RESPIRATION RATE: 18 BRPM | BODY MASS INDEX: 40.93 KG/M2 | OXYGEN SATURATION: 100 %

## 2023-11-17 DIAGNOSIS — D50.9 IRON DEFICIENCY ANEMIA, UNSPECIFIED IRON DEFICIENCY ANEMIA TYPE: ICD-10-CM

## 2023-11-17 PROCEDURE — 1159F PR MEDICATION LIST DOCUMENTED IN MEDICAL RECORD: ICD-10-PCS | Mod: CPTII,S$GLB,, | Performed by: INTERNAL MEDICINE

## 2023-11-17 PROCEDURE — 3044F PR MOST RECENT HEMOGLOBIN A1C LEVEL <7.0%: ICD-10-PCS | Mod: CPTII,S$GLB,, | Performed by: INTERNAL MEDICINE

## 2023-11-17 PROCEDURE — 99205 OFFICE O/P NEW HI 60 MIN: CPT | Mod: S$GLB,,, | Performed by: INTERNAL MEDICINE

## 2023-11-17 PROCEDURE — 3008F BODY MASS INDEX DOCD: CPT | Mod: CPTII,S$GLB,, | Performed by: INTERNAL MEDICINE

## 2023-11-17 PROCEDURE — 3074F PR MOST RECENT SYSTOLIC BLOOD PRESSURE < 130 MM HG: ICD-10-PCS | Mod: CPTII,S$GLB,, | Performed by: INTERNAL MEDICINE

## 2023-11-17 PROCEDURE — 1159F MED LIST DOCD IN RCRD: CPT | Mod: CPTII,S$GLB,, | Performed by: INTERNAL MEDICINE

## 2023-11-17 PROCEDURE — 3044F HG A1C LEVEL LT 7.0%: CPT | Mod: CPTII,S$GLB,, | Performed by: INTERNAL MEDICINE

## 2023-11-17 PROCEDURE — 3008F PR BODY MASS INDEX (BMI) DOCUMENTED: ICD-10-PCS | Mod: CPTII,S$GLB,, | Performed by: INTERNAL MEDICINE

## 2023-11-17 PROCEDURE — 99205 PR OFFICE/OUTPT VISIT, NEW, LEVL V, 60-74 MIN: ICD-10-PCS | Mod: S$GLB,,, | Performed by: INTERNAL MEDICINE

## 2023-11-17 PROCEDURE — 3078F PR MOST RECENT DIASTOLIC BLOOD PRESSURE < 80 MM HG: ICD-10-PCS | Mod: CPTII,S$GLB,, | Performed by: INTERNAL MEDICINE

## 2023-11-17 PROCEDURE — 3074F SYST BP LT 130 MM HG: CPT | Mod: CPTII,S$GLB,, | Performed by: INTERNAL MEDICINE

## 2023-11-17 PROCEDURE — 3078F DIAST BP <80 MM HG: CPT | Mod: CPTII,S$GLB,, | Performed by: INTERNAL MEDICINE

## 2023-11-17 PROCEDURE — 99999 PR PBB SHADOW E&M-EST. PATIENT-LVL V: ICD-10-PCS | Mod: PBBFAC,,, | Performed by: INTERNAL MEDICINE

## 2023-11-17 PROCEDURE — 99999 PR PBB SHADOW E&M-EST. PATIENT-LVL V: CPT | Mod: PBBFAC,,, | Performed by: INTERNAL MEDICINE

## 2023-11-17 RX ORDER — IRON,CARBONYL/ASCORBIC ACID 100-250 MG
1 TABLET ORAL DAILY
Qty: 90 TABLET | Refills: 5 | Status: SHIPPED | OUTPATIENT
Start: 2023-11-17 | End: 2025-05-10

## 2023-11-17 NOTE — PROGRESS NOTES
[facility]  [id number] [place] [address]  [place] [address]  Phone: [phone];  Fax: [phone]    Patient ID: Main Aguilar   Chief Complaint: Establish Care (MARVIN)  MRN:  [id number]     Reason for Referral:  MARVIN  Subjective   Main Aguilar is a pleasant 28 y.o. female with a history of PCOS and prediabetes who presents to clinic to establish care for treatment of iron-deficiency anemia and is accompanied by her .      The patient reports that she feels in her usual state of health.  The only thing that she is noticed is some right upper quadrant abdominal pain and she also has bilateral groin pain.      She reports that when she was approximately 12 years old she had a blood clot in her brain; she called her mom who confirmed this and they were told that the clot was secondary to too many kicks to the head during [employer] training.  She was hospitalized in the ICU at [facility] and treated with an IV medication with resolution of her clot, per her mother.  She did not require any treatment after discharge from the hospital.  She had no recurrence of this issue.  Other than that she does not recall any other clotting events or being told that she was anemic or had any issue with her blood counts.    She denies gross bleeding.  She describes her menstrual periods as regular, lasting 6-7 days; she uses 2-3 tampons per day; she has had some mild intermenstrual bleeding this year.  Her last Pap smear was September 2022 at an outside facility.  She was told to have another one in 1 year.  Encouraged her to follow-up with OBGYN.    I reviewed the plan with her as documented below and she is in agreement.    Review of Systems:  Review of Systems   Constitutional:  Negative for activity change, appetite change, chills, diaphoresis, fatigue, fever and unexpected weight change.   HENT:  Negative for nosebleeds.    Respiratory:  Negative for shortness of  "breath.    Cardiovascular:  Negative for chest pain.   Gastrointestinal:  Negative for abdominal distention, abdominal pain, anal bleeding, blood in stool, constipation, diarrhea, nausea and vomiting.   Genitourinary:  Positive for menstrual problem (intermenstrual bleeding this year). Negative for difficulty urinating and hematuria.   Musculoskeletal:  Negative for arthralgias, back pain and myalgias.   Skin:  Negative for rash.   Neurological:  Negative for dizziness, weakness, light-headedness and headaches.   Hematological:  Does not bruise/bleed easily.   Psychiatric/Behavioral:  The patient is not nervous/anxious.      History       Past Medical History:   Diagnosis Date    Asthma     Nontoxic single thyroid nodule     PCOS (polycystic ovarian syndrome)     Prediabetes     Seborrheic dermatitis        Past Surgical History:   Procedure Laterality Date    EXCISION OF GANGLION OF WRIST Right 9/11/2023    Procedure: EXCISION, GANGLION CYST, WRIST;  Surgeon: Vinay Camarena MD;  Location: [facility];  Service: Orthopedics;  Laterality: Right;  excision ganglion cyst dorsal right wrist       Family History   Problem Relation Age of Onset    Cancer Mother         Leukemia    Miscarriages / Stillbirths Mother     Hypertension Father     Cancer Maternal Grandmother         Colon    Cancer Paternal Grandmother         Breast       Review of patient's allergies indicates:   Allergen Reactions    Lactase Rash       Social History     Tobacco Use    Smoking status: Never     Passive exposure: Never    Smokeless tobacco: Never   Substance Use Topics    Alcohol use: Yes     Alcohol/week: 1.0 standard drink of alcohol     Types: 1 Drinks containing 0.5 oz of alcohol per week     Comment: Maybe once every 3 - 4 months    Drug use: Not Currently     Types: Marijuana     Comment: Tried it over 4 years ago       Physical Exam     Vitals:  /76   Pulse 78   Temp 97.9 °F (36.6 °C)   Resp 18   Ht 5' 1" (1.549 m)   Wt " 98.4 kg (216 lb 13.2 oz)   LMP 10/29/2023   SpO2 100%   BMI 40.97 kg/m²     Physical Exam:  Physical Exam  Constitutional:       General: She is not in acute distress.     Appearance: Normal appearance. She is not ill-appearing.   HENT:      Head: Normocephalic and atraumatic.   Eyes:      Extraocular Movements: Extraocular movements intact.      Conjunctiva/sclera: Conjunctivae normal.   Cardiovascular:      Rate and Rhythm: Normal rate and regular rhythm.      Heart sounds: No murmur heard.  Pulmonary:      Effort: Pulmonary effort is normal. No respiratory distress.      Breath sounds: Normal breath sounds. No wheezing, rhonchi or rales.   Abdominal:      General: Bowel sounds are normal. There is no distension.      Palpations: Abdomen is soft. There is no hepatomegaly, splenomegaly or mass.      Tenderness: There is no abdominal tenderness. There is no guarding.   Musculoskeletal:         General: Normal range of motion.      Cervical back: Neck supple. No tenderness.      Right lower leg: No edema.      Left lower leg: No edema.   Skin:     Findings: No rash.   Neurological:      General: No focal deficit present.      Mental Status: She is alert and oriented to person, place, and time.   Psychiatric:         Mood and Affect: Mood normal.         Behavior: Behavior normal.         Thought Content: Thought content normal.        Labs   Labs:  No visits with results within 2 Day(s) from this visit.   Latest known visit with results is:   Lab Visit on 11/08/2023   Component Date Value Ref Range Status    WBC 11/08/2023 5.02  3.90 - 12.70 K/uL Final    RBC 11/08/2023 4.42  4.00 - 5.40 M/uL Final    Hemoglobin 11/08/2023 9.2 (L)  12.0 - 16.0 g/dL Final    Hematocrit 11/08/2023 32.5 (L)  37.0 - 48.5 % Final    MCV 11/08/2023 74 (L)  82 - 98 fL Final    MCH 11/08/2023 20.8 (L)  27.0 - 31.0 pg Final    MCHC 11/08/2023 28.3 (L)  32.0 - 36.0 g/dL Final    RDW 11/08/2023 18.3 (H)  11.5 - 14.5 % Final    Platelets  11/08/2023 586 (H)  150 - 450 K/uL Final    MPV 11/08/2023 9.4  9.2 - 12.9 fL Final    Immature Granulocytes 11/08/2023 0.2  0.0 - 0.5 % Final    Gran # (ANC) 11/08/2023 1.7 (L)  1.8 - 7.7 K/uL Final    Immature Grans (Abs) 11/08/2023 0.01  0.00 - 0.04 K/uL Final    Comment: Mild elevation in immature granulocytes is non specific and   can be seen in a variety of conditions including stress response,   acute inflammation, trauma and pregnancy. Correlation with other   laboratory and clinical findings is essential.      Lymph # 11/08/2023 2.8  1.0 - 4.8 K/uL Final    Mono # 11/08/2023 0.4  0.3 - 1.0 K/uL Final    Eos # 11/08/2023 0.1  0.0 - 0.5 K/uL Final    Baso # 11/08/2023 0.04  0.00 - 0.20 K/uL Final    nRBC 11/08/2023 0  0 /100 WBC Final    Gran % 11/08/2023 34.2 (L)  38.0 - 73.0 % Final    Lymph % 11/08/2023 54.8 (H)  18.0 - 48.0 % Final    Mono % 11/08/2023 8.4  4.0 - 15.0 % Final    Eosinophil % 11/08/2023 1.6  0.0 - 8.0 % Final    Basophil % 11/08/2023 0.8  0.0 - 1.9 % Final    Differential Method 11/08/2023 Automated   Final    Iron 11/08/2023 36  30 - 160 ug/dL Final    Transferrin 11/08/2023 366  200 - 375 mg/dL Final    TIBC 11/08/2023 542 (H)  250 - 450 ug/dL Final    Saturated Iron 11/08/2023 7 (L)  20 - 50 % Final    Ferritin 11/08/2023 4 (L)  20.0 - 300.0 ng/mL Final    Sodium 11/08/2023 140  136 - 145 mmol/L Final    Potassium 11/08/2023 4.1  3.5 - 5.1 mmol/L Final    Chloride 11/08/2023 108  95 - 110 mmol/L Final    CO2 11/08/2023 23  23 - 29 mmol/L Final    Glucose 11/08/2023 58 (L)  70 - 110 mg/dL Final    BUN 11/08/2023 9  6 - 20 mg/dL Final    Creatinine 11/08/2023 0.7  0.5 - 1.4 mg/dL Final    Calcium 11/08/2023 9.4  8.7 - 10.5 mg/dL Final    Total Protein 11/08/2023 7.5  6.0 - 8.4 g/dL Final    Albumin 11/08/2023 3.7  3.5 - 5.2 g/dL Final    Total Bilirubin 11/08/2023 0.4  0.1 - 1.0 mg/dL Final    Comment: For infants and newborns, interpretation of results should be based  on gestational  age, weight and in agreement with clinical  observations.    Premature Infant recommended reference ranges:  Up to 24 hours.............<8.0 mg/dL  Up to 48 hours............<12.0 mg/dL  3-5 days..................<15.0 mg/dL  6-29 days.................<15.0 mg/dL      Alkaline Phosphatase 11/08/2023 75  55 - 135 U/L Final    AST 11/08/2023 21  10 - 40 U/L Final    ALT 11/08/2023 12  10 - 44 U/L Final    eGFR 11/08/2023 >60.0  >60 mL/min/1.73 m^2 Final    Anion Gap 11/08/2023 9  8 - 16 mmol/L Final    Hemoglobin A1C 11/08/2023 5.6  4.0 - 5.6 % Final    Comment: ADA Screening Guidelines:  5.7-6.4%  Consistent with prediabetes  >or=6.5%  Consistent with diabetes    High levels of fetal hemoglobin interfere with the HbA1C  assay. Heterozygous hemoglobin variants (HbS, HgC, etc)do  not significantly interfere with this assay.   However, presence of multiple variants may affect accuracy.      Estimated Avg Glucose 11/08/2023 114  68 - 131 mg/dL Final    Insulin 11/08/2023 11.8  <25.0 uU/mL Final    Insulin Collection Interval 11/08/2023 na   Final        Assessment and Plan   Iron-deficiency anemia with no overt identifiable cause  The patient has been anemic since at least 2018; she has a normal diet; no signs of menorrhagia; she describes her menstrual periods as regular, lasting 6-7 days; she uses 2-3 tampons per day; she has had some mild intermenstrual bleeding this year.  Her last Pap smear was September 2022 at an outside hospital and normal as far as she knows; she denies other gross bleeding; family history noncontributory.  She also has thrombocytosis which I believe is elevated due to the anemia.  Once her anemia is corrected, if the platelet count is still elevated we will work this up separately.  We will obtain medical records from [facility] to confirm this brain thrombosis she had as a child  Iron deficit: 1158 (w/target hgb of 12); current H/H 9.2/32  Tolerating PO iron w/some improvement in her iron  indices  Refer to GI for endoscopies; obtain STFR and UA  Discussed with pt and her  it is important to try to determine the underlying cause of MARVIN and they are in agreement with the plan    Cancer Screening  PAP Smear: Due    Chronic Medical Conditions  PCOS  Hx R Wrist Ganglion Cyst status post excision 09/11/2023  Hx pre-diabetes  Dyshidrotic Eczema  Nontoxic thyroid nodule          Med Onc Chart Routing      Follow up with physician 2 months.   Follow up with CRY[facility]    Infusion scheduling note    Injection scheduling note    Labs CBC, iron and TIBC and ferritin   Scheduling:  Preferred lab:  Lab interval:     Imaging    Pharmacy appointment    Other referrals       Additional referrals needed  GI for endoscopies            The patient was seen, interviewed and examined. Pertinent lab and radiologic studies were reviewed. Pt instructed to call should they develop concerning signs/symptoms or have further questions.        Portions of the record may have been created with voice recognition software. Occasional wrong-word or sound-a-like substitutions may have occurred due to the inherent limitations of voice recognition software. Read the chart carefully and recognize, using context, where substitutions have occurred.      Silvia Salazar MD    Hematology/Oncology

## 2023-11-20 ENCOUNTER — CLINICAL SUPPORT (OUTPATIENT)
Dept: REHABILITATION | Facility: HOSPITAL | Age: 28
End: 2023-11-20
Payer: COMMERCIAL

## 2023-11-20 ENCOUNTER — PATIENT MESSAGE (OUTPATIENT)
Dept: INTERNAL MEDICINE | Facility: CLINIC | Age: 28
End: 2023-11-20

## 2023-11-20 DIAGNOSIS — M54.89 INTERSCAPULAR PAIN: Primary | ICD-10-CM

## 2023-11-20 DIAGNOSIS — M54.2 NECK PAIN: ICD-10-CM

## 2023-11-20 DIAGNOSIS — M54.9 UPPER BACK PAIN: ICD-10-CM

## 2023-11-20 PROCEDURE — 97110 THERAPEUTIC EXERCISES: CPT | Mod: PN

## 2023-11-20 PROCEDURE — 97530 THERAPEUTIC ACTIVITIES: CPT | Mod: PN

## 2023-11-20 PROCEDURE — 97112 NEUROMUSCULAR REEDUCATION: CPT | Mod: PN

## 2023-11-20 NOTE — PROGRESS NOTES
"[facility]   Physical Therapy Treatment Note        Name: Main Carlson [facility]  Clinic Number: [id number]    Therapy Diagnosis:   Encounter Diagnoses   Name Primary?    Interscapular pain Yes    Neck pain     Upper back pain        Physician: Sandra Huang MD    Visit Date: 11/20/2023     Physician Orders: PT Eval and Treat      Medical Diagnosis from Referral:   M54.2 (ICD-10-CM) - Neck pain   M54.9 (ICD-10-CM) - Upper back pain      Evaluation Date: 11/10/2023  Authorization Period Expiration: 12/31/23  Plan of Care Expiration: 1/18/24  Visit # / Visits authorized: 4/ 10  Foto: 2/3     Precautions: Standard, pre diabetic, PCOS  PTA Visit #: 0/5     Time In: 9:21 am  Time Out: 10: 20  Total Billable Time:59 minutes  with tech assistance  Subjective     Patient reports: she is still having some tingling in the arms and legs at times    She was compliant with home exercise program.  Response to previous treatment: no notable change  Functional change: no notable change    Pain: 6/10     Location: mid back cervical spine    Objective      Objective Measures updated at progress report or POC update only unless otherwise noted.       Treatment     Main received the treatments listed below:     MANUAL THERAPY TECHNIQUES were applied for (0) minutes, including:    Deferred:  Cervical upglides and downglides bilaterally  Thoracic mid manipulations     THERAPEUTIC EXERCISES to develop strength, endurance, ROM, flexibility, posture, and core stabilization for (19) minutes including:    UBE 3' back  Trunk rotation setting 3 20 # 3x 10  Trunk extension machine 60 # 1x 15; 80# 1x20  Seated rows with scapular retract/protract (reproduces tingling at times but not in prone when head is supported) 25# 3x 10 high then low    Deferred:  Seated overhead shrugs 3x 10 (occasional tingling ; T4 syndrome like behaviors seem present so PT to monitor)  SL thoracic rotation 2" hold 15x " "B  Prayer stretch 30" 3x      NEUROMUSCULAR RE-EDUCATION ACTIVITIES to improve Balance, Coordination, Kinesthetic, Sense, Proprioception, and Posture for (25) minutes.  The following were included:    Posterior pelvic tilt 2x 10  PPT with single leg march 2x 10 per leg   PPT with table top hold 1x 10 then add head tuck 2x 10  Supine chin tuck with retraction (pressure stretch felt in posterior neck and consistent with expectations)  Supine head flexion 2x 10      Deferred:  Prone scapular retraction/depression 2x 10  Prone T 3x 10  Prone Y 3x 10  Shoulder extension 2# 3x10  Seated scapular retraction  Plank 20" hold 5x  Cable Thoracic rotation right side 4x5 20#    PT educated using therapeutic activities and pt walking through the set up on ergonomic set up for her home office to reduce load and increase function for 15 minute with desk simulation    Patient Education and Home Exercises       Home Exercises Provided and Patient Education Provided     Education provided: (during therex)  PURPOSE: Patient educated on the impairments noted above and the effects of physical therapy intervention to improve overall condition and QOL.   Eduated pt on what is normal feelings with therex vs abnormal.    Written Home Exercises Provided: yes.  Exercises were reviewed and Nilsonrajesh was able to demonstrate them prior to the end of the session.  Nilsonrajesh demonstrated good  understanding of the education provided. See EMR under Patient Instructions for exercises provided during therapy sessions.    Assessment     Pt tolerated today's session with PT noting her needs for a lot of changes at the work set up so we focused a lot on educating in this to reduce neural tension when working. Pt's core is very weak.  Pt will continue to be progressed as tolerated.    Main is progressing well towards her goals.   Pt prognosis is Excellent.     Pt will continue to benefit from skilled outpatient physical therapy to address the " deficits listed in the problem list box on initial evaluation, provide pt/family education and to maximize pt's level of independence in the home and community environment.     Pt's spiritual, cultural and educational needs considered and pt agreeable to plan of care and goals.     Anticipated Barriers for therapy: none    Goals:  Short Term Goals: In 4 weeks   1.Pt to be educated on HEP. met  2.Patient to increase cervical flexion to 60 degrees without sharp pain  3.Patient to increase strength to 4/5 in mid traps.  4.Patient to have pain less than 2/10 or better at all times.  5.Patient to improve score on the FOTO by 10%. Progressing subjectively but not with FOTO yet  6. Pt to be educated on postural and body mechanics awareness.     Long Term Goals: In 10 weeks 1/18/24  1. Patient to improve score on the FOTO to 72.  2. Patient to demo increase in UE strength to 3+/5 in lower traps  3. Patient to perform daily activities including dead lifting up 65# or better and lifting up to 20 # overhead or better.    Plan     Continue Plan of Care (POC) and progress per patient tolerance. See treatment section for details on planned progressions next session.      Radha Dickerson, PT

## 2023-11-22 ENCOUNTER — CLINICAL SUPPORT (OUTPATIENT)
Dept: REHABILITATION | Facility: HOSPITAL | Age: 28
End: 2023-11-22
Payer: COMMERCIAL

## 2023-11-22 DIAGNOSIS — M54.89 INTERSCAPULAR PAIN: Primary | ICD-10-CM

## 2023-11-22 DIAGNOSIS — M54.2 NECK PAIN: ICD-10-CM

## 2023-11-22 DIAGNOSIS — M54.9 UPPER BACK PAIN: ICD-10-CM

## 2023-11-22 PROCEDURE — 97110 THERAPEUTIC EXERCISES: CPT | Mod: PN

## 2023-11-22 PROCEDURE — 97112 NEUROMUSCULAR REEDUCATION: CPT | Mod: PN

## 2023-11-22 NOTE — PROGRESS NOTES
"[facility]   Physical Therapy Treatment Note  and Progress Note       Name: Main Carlson [facility]  Clinic Number: [id number]    Therapy Diagnosis:   Encounter Diagnoses   Name Primary?    Interscapular pain Yes    Neck pain     Upper back pain      Physician: Sandra Huang MD    Visit Date: 11/22/2023     Physician Orders: PT Eval and Treat      Medical Diagnosis from Referral:   M54.2 (ICD-10-CM) - Neck pain   M54.9 (ICD-10-CM) - Upper back pain      Evaluation Date: 11/10/2023  Authorization Period Expiration: 12/31/23  Plan of Care Expiration: 1/18/24  Visit # / Visits authorized: 5/ 10  Foto: 2/3     Precautions: Standard, pre diabetic, PCOS  PTA Visit #: 0/5     Time In: 9:30 am  Time Out: 10:30  Total Billable Time:60 minutes   Subjective     Patient reports: she went horse back riding and had no tingling in her arms or legs.    She was compliant with home exercise program.  Response to previous treatment: no notable change  Functional change: no notable change    Pain: 0/10     Location: mid back cervical spine    Objective      Shoulder mid trap 4+/5 lower trap 4/5 B    Objective Measures updated at progress report or POC update only unless otherwise noted.       Treatment     Main received the treatments listed below:     MANUAL THERAPY TECHNIQUES were applied for (0) minutes, including:    Deferred:  Cervical upglides and downglides bilaterally  Thoracic mid manipulations     THERAPEUTIC EXERCISES to develop strength, endurance, ROM, flexibility, posture, and core stabilization for (30) minutes including:    UBE 4' backward standing  Trunk extension machine 60 # 1x 10; 100# 1x30  Seated rows with scapular retract/protract (reproduces tingling at times but not in prone when head is supported) 35# 3x 10 high then low  Trunk rotation setting 3 24 # 1x 20    Deferred:  SL thoracic rotation 2" hold 15x B  Prayer stretch 30" 3x      NEUROMUSCULAR RE-EDUCATION " "ACTIVITIES to improve Balance, Coordination, Kinesthetic, Sense, Proprioception, and Posture for (30) minutes.  The following were included:    Med x c spine 284 seat with 2 inch seat and .5 counterbalance; 120 # 3 min x2 RPE 4  Seated overhead shrugs 1x 10 then 2# per hand added 3x 10   Prone T 3x 10 1# per hand  Prone Y 3x 10 0#   Prone (scapular depression cued) Shoulder extension 2# 3x 10    Deferred:  Posterior pelvic tilt 2x 10  PPT with single leg march 2x 10 per leg   PPT with table top hold 1x 10 then add head tuck 2x 10  Supine chin tuck with retraction (pressure stretch felt in posterior neck and consistent with expectations)  Supine head flexion 2x 10  Prone scapular retraction/depression 2x 10  Seated scapular retraction  Plank 20" hold 5x  Cable Thoracic rotation right side 4x5 20#    PT educated using therapeutic activities and pt walking through the set up on ergonomic set up for her home office to reduce load and increase function for 0 minute with desk simulation    Patient Education and Home Exercises       Home Exercises Provided and Patient Education Provided     Education provided: (during therex)  PURPOSE: Patient educated on the impairments noted above and the effects of physical therapy intervention to improve overall condition and QOL.   Eduated pt on what is normal feelings with therex vs abnormal.    Written Home Exercises Provided: yes.  Exercises were reviewed and Main was able to demonstrate them prior to the end of the session.  Main demonstrated good  understanding of the education provided. See EMR under Patient Instructions for exercises provided during therapy sessions.    Assessment     Pt tolerated today's session with Pt not producing UE symptoms like on prior visits as this is improving as strength and mobility gains. PT to advance her in more core work on the follow up. PT encouraged UE therex at the gym to begin including bicep,  tricep and mid trap/lower trap " work if she feels she is ready to advance to 3x a week at the gym on those tasks and we will advance other tasks here. Pt is pleased with progress.  Pt will continue to be progressed as tolerated.    Main is progressing well towards her goals.   Pt prognosis is Excellent.     Pt will continue to benefit from skilled outpatient physical therapy to address the deficits listed in the problem list box on initial evaluation, provide pt/family education and to maximize pt's level of independence in the home and community environment.     Pt's spiritual, cultural and educational needs considered and pt agreeable to plan of care and goals.     Anticipated Barriers for therapy: none    Goals:  Short Term Goals: In 4 weeks   1.Pt to be educated on HEP. met  2.Patient to increase cervical flexion to 60 degrees without sharp pain. progressing  3.Patient to increase strength to 4/5 in mid traps. met  4.Patient to have pain less than 2/10 or better at all times. progressing  5.Patient to improve score on the FOTO by 10%. Progressing subjectively but not with FOTO yet  6. Pt to be educated on postural and body mechanics awareness. met     Long Term Goals: In 10 weeks 1/18/24  1. Patient to improve score on the FOTO to 72.  2. Patient to demo increase in UE strength to 3+/5 in lower traps  3. Patient to perform daily activities including dead lifting up 65# or better and lifting up to 20 # overhead or better.    Plan     Continue Plan of Care (POC) and progress per patient tolerance. See treatment section for details on planned progressions next session.      Radha Dickerson, PT

## 2023-12-04 ENCOUNTER — CLINICAL SUPPORT (OUTPATIENT)
Dept: REHABILITATION | Facility: HOSPITAL | Age: 28
End: 2023-12-04
Payer: COMMERCIAL

## 2023-12-04 DIAGNOSIS — M54.9 UPPER BACK PAIN: ICD-10-CM

## 2023-12-04 DIAGNOSIS — M54.2 NECK PAIN: ICD-10-CM

## 2023-12-04 DIAGNOSIS — M54.89 INTERSCAPULAR PAIN: Primary | ICD-10-CM

## 2023-12-04 PROCEDURE — 97110 THERAPEUTIC EXERCISES: CPT | Mod: PN

## 2023-12-04 PROCEDURE — 97112 NEUROMUSCULAR REEDUCATION: CPT | Mod: PN

## 2023-12-04 NOTE — PROGRESS NOTES
[facility]   Physical Therapy Treatment Note  and Progress Note       Name: Main Carlson [facility]  Clinic Number: [id number]    Therapy Diagnosis:   Encounter Diagnoses   Name Primary?    Interscapular pain Yes    Neck pain     Upper back pain      Physician: Sandra Huang MD    Visit Date: 12/4/2023     Physician Orders: PT Eval and Treat      Medical Diagnosis from Referral:   M54.2 (ICD-10-CM) - Neck pain   M54.9 (ICD-10-CM) - Upper back pain      Evaluation Date: 11/10/2023  Authorization Period Expiration: 12/31/23  Plan of Care Expiration: 1/18/24  Visit # / Visits authorized: 6/ 10  Foto: 2/3  Progress note due 30 days from 12/4/23     Precautions: Standard, pre diabetic, PCOS  PTA Visit #: 0/5     Time In: 10:00 am  Time Out: 11: 12 am  Total Billable Time:72 minutes including tech assistance  Subjective     Patient reports: she is doing ok but just getting over being out sick for about 2 weeks.    She was compliant with home exercise program.  Response to previous treatment: no notable change  Functional change: no notable change    Pain: 0/10     Location: mid back cervical spine    Objective      Cervical Spine AROM at eval; today    Degrees Pain   Flexion 50; 55 n   Extend 60;70 n   R side bend 30;50     L side bend 30;50     R rotation 70;85 n   L rotation 80;85 n      T spine rotation R/L seated full but in open book 90 to the R and 80 % L improved to full B      Strength at eval; today  Muscle (Myotome) Right Left   Cervical Deep neck Flexor hold time  hold 15 sec/32 sec  ; today up to 25 sec     Shoulder Abduction 5 5   Elbow Flexors (C5) 5 5   Wrist Extensors (C6) 5 5   Elbow Extensors (C7) 5 5   ER (arm at side) 4+ 4+   IR (arm at side) 5 5   Thumb extensors 5 5   Intrinsic strength 5 5   Mid traps 3+/5; lower 2+/5; improved to 4 and 3+ today    Objective Measures updated at progress report or POC update only unless otherwise noted.       Treatment  "    Antmitrahia received the treatments listed below:     MANUAL THERAPY TECHNIQUES were applied for (0) minutes, including:    Deferred:  Cervical upglides and downglides bilaterally  Thoracic mid manipulations     THERAPEUTIC EXERCISES to develop strength, endurance, ROM, flexibility, posture, and core stabilization for (30) minutes including:    UBE 4' backward standing focus on wolff 10-14  Seated rows with scapular retract/protract (reproduces tingling at times but not in prone when head is supported) 50# 3x 10 high   Trunk extension machine 60 # 1x 10; 80# 1x20 (reduced load due to recent illness)  Trunk rotation setting 3 26 # 1x 20 B    Deferred:  SL thoracic rotation 2" hold 15x B  Prayer stretch 30" 3x      NEUROMUSCULAR RE-EDUCATION ACTIVITIES to improve Balance, Coordination, Kinesthetic, Sense, Proprioception, and Posture for (42) minutes.  The following were included:    Med x c spine 311 seat with 2 inch seat and 1.3 counterbalance; 132 # 4x 10 (fatigued quickly) min  RPE 5   Shoulder shrugs 15# 3x 10 B with mirror for feed back for recruitment  Seated overhead shrugs 3# per hand added 3x 10 B  Prone T 3x 10 1# per hand  Prone Y 3x 10 1# B  Prone (scapular depression cued) Shoulder extension 2# 3x 10    Deferred:  Posterior pelvic tilt 2x 10  PPT with single leg march 2x 10 per leg   PPT with table top hold 1x 10 then add head tuck 2x 10  Supine chin tuck with retraction (pressure stretch felt in posterior neck and consistent with expectations)  Supine head flexion 2x 10  Prone scapular retraction/depression 2x 10  Seated scapular retraction  Plank 20" hold 5x  Cable Thoracic rotation right side 4x5 20#    PT educated using therapeutic activities and pt walking through the set up on ergonomic set up for her home office to reduce load and increase function for 0 minute with desk simulation    Patient Education and Home Exercises       Home Exercises Provided and Patient Education Provided     Education " provided: (during therex)  PURPOSE: Patient educated on the impairments noted above and the effects of physical therapy intervention to improve overall condition and QOL.   Eduated pt on what is normal feelings with therex vs abnormal.    Written Home Exercises Provided: yes.  Exercises were reviewed and Main was able to demonstrate them prior to the end of the session.  Main demonstrated good  understanding of the education provided. See EMR under Patient Instructions for exercises provided during therapy sessions.    Assessment     Pt tolerated today's session a recent 2 week off from PT as she has been very ill. Tingling is less frequent but noted with sleeping on R shoulder in flexion in R side lying intermittently but no longer with driving, reaching tasks or in the legs. PT assessed ROM and PT noted good gains in all areas but EROM flexion can still improve and scapular support is still needed to advance as well as reducing lat tension on the lumbar spine and increase core support to reduce anterior tilt of pelvis to improve neck seated posture at rest/working. Pt is fatigued from illness so PT unloaded some tasks and pacing as slower to increase motor control and muscle recruitment and allow for recovery from fatigue.  Pt will continue to be progressed as tolerated.    Main is progressing well towards her goals.   Pt prognosis is Excellent.     Pt will continue to benefit from skilled outpatient physical therapy to address the deficits listed in the problem list box on initial evaluation, provide pt/family education and to maximize pt's level of independence in the home and community environment.     Pt's spiritual, cultural and educational needs considered and pt agreeable to plan of care and goals.     Anticipated Barriers for therapy: none    Goals:  Short Term Goals: In 4 weeks   1.Pt to be educated on HEP. met  2.Patient to increase cervical flexion to 60 degrees without sharp pain.  progressing  3.Patient to increase strength to 4/5 in mid traps. met  4.Patient to have pain less than 2/10 or better at all times. progressing  5.Patient to improve score on the FOTO by 10%. Progressing subjectively but not with FOTO yet  6. Pt to be educated on postural and body mechanics awareness. met     Long Term Goals: In 10 weeks 1/18/24  1. Patient to improve score on the FOTO to 72.  2. Patient to demo increase in UE strength to 3+/5 in lower traps  3. Patient to perform daily activities including dead lifting up 65# or better and lifting up to 20 # overhead or better.    Plan     Continue Plan of Care (POC) and progress per patient tolerance. See treatment section for details on planned progressions next session.      Radha Dickerson, PT

## 2023-12-06 ENCOUNTER — CLINICAL SUPPORT (OUTPATIENT)
Dept: REHABILITATION | Facility: HOSPITAL | Age: 28
End: 2023-12-06
Payer: COMMERCIAL

## 2023-12-06 DIAGNOSIS — M54.89 INTERSCAPULAR PAIN: Primary | ICD-10-CM

## 2023-12-06 DIAGNOSIS — M54.9 UPPER BACK PAIN: ICD-10-CM

## 2023-12-06 DIAGNOSIS — M54.2 NECK PAIN: ICD-10-CM

## 2023-12-06 PROCEDURE — 97110 THERAPEUTIC EXERCISES: CPT | Mod: PN

## 2023-12-06 PROCEDURE — 97112 NEUROMUSCULAR REEDUCATION: CPT | Mod: PN

## 2023-12-06 NOTE — PROGRESS NOTES
[facility]   Physical Therapy Treatment Note        Name: Main Carlson [facility]  Clinic Number: [id number]    Therapy Diagnosis:   Encounter Diagnoses   Name Primary?    Interscapular pain Yes    Neck pain     Upper back pain      Physician: Sandra Huang MD    Visit Date: 12/6/2023     Physician Orders: PT Eval and Treat      Medical Diagnosis from Referral:   M54.2 (ICD-10-CM) - Neck pain   M54.9 (ICD-10-CM) - Upper back pain      Evaluation Date: 11/10/2023  Authorization Period Expiration: 12/31/23  Plan of Care Expiration: 1/18/24  Visit # / Visits authorized: 7/ 10  Foto: 2/3  Progress note due 30 days from 12/4/23     Precautions: Standard, pre diabetic, PCOS  PTA Visit #: 0/5     Time In: 10:04 am  Time Out: 11:18 am  Total Billable Time:74 minutes including tech assistance  Subjective     Patient reports: feeling better. Less tingling maybe one to 2 x a day max and only for a few seconds. She notes it now when she places her hands on her hips. Scapular pain is abolished.    She was compliant with home exercise program.  Response to previous treatment: no notable change  Functional change: no notable change    Pain: 0/10     Location: mid back , cervical spine    Objective          Objective Measures updated at progress report or POC update only unless otherwise noted.       Treatment     Main received the treatments listed below:     MANUAL THERAPY TECHNIQUES were applied for (0) minutes, including:    Deferred:  Cervical upglides and downglides bilaterally  Thoracic mid manipulations     THERAPEUTIC EXERCISES to develop strength, endurance, ROM, flexibility, posture, and core stabilization for 30 minutes including:    UBE 4' backward standing focus on wolff 10-18  Seated rows with scapular retract/protract (no tingling today) 0# 3x 10 high   Trunk rotation setting 4 26 # 1x 20 B (increase to 30 on follow up)  Trunk extension machine 80 # 1x 10; 120# 1x20  "    Deferred:  SL thoracic rotation 2" hold 15x B  Prayer stretch 30" 3x      NEUROMUSCULAR RE-EDUCATION ACTIVITIES to improve Balance, Coordination, Kinesthetic, Sense, Proprioception, and Posture for (44) minutes.  The following were included:    Med x c spine 320 seat with 2 inch seat and .5 counterbalance; 178 # 3 min x2  RPE 5   1/2 kneeling PPT 2x 10  1/2 kneeling PPT with overhead press up 5# 2x 10  1/2 kneeling PPT with shoulder flexion with theraball; rotation with physioball 2x 10 per leg  add 4# ball 1x 10(to engage core)  Dead lift training with trap bar only  Dead lift trap bar 3x 5  Shoulder shrugs with trap bar 3x 6    Deferred:  Prone T 3x 10 1# per hand  Prone Y 3x 10 1# B  Prone (scapular depression cued) Shoulder extension 2# 3x 10  Posterior pelvic tilt 2x 10  PPT with single leg march 2x 10 per leg   PPT with table top hold 1x 10 then add head tuck 2x 10  Supine chin tuck with retraction (pressure stretch felt in posterior neck and consistent with expectations)  Cable Thoracic rotation right side 4x5 20#    PT educated using therapeutic activities and pt walking through the set up on ergonomic set up for her home office to reduce load and increase function for 0 minute with desk simulation    Patient Education and Home Exercises       Home Exercises Provided and Patient Education Provided     Education provided: (during therex)  PURPOSE: Patient educated on the impairments noted above and the effects of physical therapy intervention to improve overall condition and QOL.   Eduated pt on what is normal feelings with therex vs abnormal.    Written Home Exercises Provided: yes.  Exercises were reviewed and Main was able to demonstrate them prior to the end of the session.  Main demonstrated good  understanding of the education provided. See EMR under Patient Instructions for exercises provided during therapy sessions.    Assessment     Pt tolerated today's session with PT advancing load " as tolerated. Pt tends to  a lot of lordosis so PT began progressing her with tasks to release the anterior hip and engage the glute more and increase core support. PT used cues throughout to engage the core to reduce lordotic posture to improve back and neck posture and reduce poor postural habits. PT to advance core work and glute work to reduce lordosis and at some lat mobility work as she is tight.    Main is progressing well towards her goals.   Pt prognosis is Excellent.     Pt will continue to benefit from skilled outpatient physical therapy to address the deficits listed in the problem list box on initial evaluation, provide pt/family education and to maximize pt's level of independence in the home and community environment.     Pt's spiritual, cultural and educational needs considered and pt agreeable to plan of care and goals.     Anticipated Barriers for therapy: none    Goals:  Short Term Goals: In 4 weeks   1.Pt to be educated on HEP. met  2.Patient to increase cervical flexion to 60 degrees without sharp pain. progressing  3.Patient to increase strength to 4/5 in mid traps. met  4.Patient to have pain less than 2/10 or better at all times. progressing  5.Patient to improve score on the FOTO by 10%. Progressing subjectively but not with FOTO yet  6. Pt to be educated on postural and body mechanics awareness. met     Long Term Goals: In 10 weeks 1/18/24  1. Patient to improve score on the FOTO to 72.  2. Patient to demo increase in UE strength to 3+/5 in lower traps  3. Patient to perform daily activities including dead lifting up 65# or better and lifting up to 20 # overhead or better.    Plan     Continue Plan of Care (POC) and progress per patient tolerance. See treatment section for details on planned progressions next session.      Radha Dickerson, PT

## 2023-12-11 ENCOUNTER — CLINICAL SUPPORT (OUTPATIENT)
Dept: REHABILITATION | Facility: HOSPITAL | Age: 28
End: 2023-12-11
Payer: COMMERCIAL

## 2023-12-11 DIAGNOSIS — M54.9 UPPER BACK PAIN: ICD-10-CM

## 2023-12-11 DIAGNOSIS — M54.2 NECK PAIN: ICD-10-CM

## 2023-12-11 DIAGNOSIS — M54.89 INTERSCAPULAR PAIN: Primary | ICD-10-CM

## 2023-12-11 PROCEDURE — 97110 THERAPEUTIC EXERCISES: CPT | Mod: PN

## 2023-12-11 PROCEDURE — 97112 NEUROMUSCULAR REEDUCATION: CPT | Mod: PN

## 2023-12-11 NOTE — PROGRESS NOTES
"[facility]   Physical Therapy Treatment Note        Name: Main Carlson [facility]  Clinic Number: [id number]    Therapy Diagnosis:   Encounter Diagnoses   Name Primary?    Interscapular pain Yes    Neck pain     Upper back pain      Physician: Sandra Huang MD    Visit Date: 12/11/2023     Physician Orders: PT Eval and Treat      Medical Diagnosis from Referral:   M54.2 (ICD-10-CM) - Neck pain   M54.9 (ICD-10-CM) - Upper back pain      Evaluation Date: 11/10/2023  Authorization Period Expiration: 12/31/23  Plan of Care Expiration: 1/18/24  Visit # / Visits authorized: 8/ 10  Foto: 3/3  Progress note due 30 days from 12/4/23     Precautions: Standard, pre diabetic, PCOS  PTA Visit #: 0/5     Time In: 10:00 am  Time Out: 11:10 am  Total Billable Time:70 minutes including tech assistance  Subjective     Patient reports: leg tingles on rows but reduce with PPT when performing.    She was compliant with home exercise program.  Response to previous treatment: no notable change  Functional change: no notable change    Pain: 0/10     Location: mid back , cervical spine    Objective          Objective Measures updated at progress report or POC update only unless otherwise noted.       Treatment     Main received the treatments listed below:     MANUAL THERAPY TECHNIQUES were applied for (0) minutes, including:    THERAPEUTIC EXERCISES to develop strength, endurance, ROM, flexibility, posture, and core stabilization for 30 minutes including:    UBE 5' backward standing focus on wolff 10-18  Seated rows 50 # with PPT to reduce R Leg tingling today; no UE tingling 3x 10 high   Trunk rotation setting 4 30 # 1x 20 B (increase to 30 on follow up)  Trunk extension machine 80 # 1x 10; 120# 1x20       Deferred:  SL thoracic rotation 2" hold 15x B  Prayer stretch 30" 3x      NEUROMUSCULAR RE-EDUCATION ACTIVITIES to improve Balance, Coordination, Kinesthetic, Sense, Proprioception, and " Posture for (40) minutes.  The following were included:    Qped alternating arms with transverse abdominus engaged 3x 10 R then L  Qped alternating leg 1x 10 per leg  Hundreds 5x 50-60 reps  Med x c spine 320 seat with 2 inch seat and .5 counterbalance; 180  4x 15-20   RPE 5   Hip extension on shuttle 2 bands 3x 10 per leg  Dead lift with trap bar only 3x 10  1/2 kneeling PPT with overhead press up 7 # 2x 10    Deferred:  1/2 kneeling PPT 2x 10  1/2 kneeling PPT with shoulder flexion with theraball; rotation with physioball 2x 10 per leg  add 4# ball 1x 10(to engage core)  Shoulder shrugs with trap bar 3x 6  Supine chin tuck with retraction (pressure stretch felt in posterior neck and consistent with expectations)  Cable Thoracic rotation right side 4x5 20#    PT educated using therapeutic activities and pt walking through the set up on ergonomic set up for her home office to reduce load and increase function for 0 minute with desk simulation    Patient Education and Home Exercises       Home Exercises Provided and Patient Education Provided     Education provided: (during therex)  PURPOSE: Patient educated on the impairments noted above and the effects of physical therapy intervention to improve overall condition and QOL.   Eduated pt on what is normal feelings with therex vs abnormal.    Written Home Exercises Provided: yes.  Exercises were reviewed and Main was able to demonstrate them prior to the end of the session.  Main demonstrated good  understanding of the education provided. See EMR under Patient Instructions for exercises provided during therapy sessions.    Assessment     Pt tolerated today's session with PT advancing core load more to reduce tingling in the legs when she gets excessive lordosis. Pt noted when she uses her core better, she has no symptoms.Pt fatigues in the posterior chain and needs cues to engage quality ROM to avoid bending at the back vs using hip hinge on lunges. Pt  fatigued with therex but denied pain. PT to advance core work and glute work to reduce lordosis and at some lat mobility work as she is tight.    Main is progressing well towards her goals.   Pt prognosis is Excellent.     Pt will continue to benefit from skilled outpatient physical therapy to address the deficits listed in the problem list box on initial evaluation, provide pt/family education and to maximize pt's level of independence in the home and community environment.     Pt's spiritual, cultural and educational needs considered and pt agreeable to plan of care and goals.     Anticipated Barriers for therapy: none    Goals:  Short Term Goals: In 4 weeks   1.Pt to be educated on HEP. met  2.Patient to increase cervical flexion to 60 degrees without sharp pain. progressing  3.Patient to increase strength to 4/5 in mid traps. met  4.Patient to have pain less than 2/10 or better at all times. met  5.Patient to improve score on the FOTO by 10%. met  6. Pt to be educated on postural and body mechanics awareness. met     Long Term Goals: In 10 weeks 1/18/24  1. Patient to improve score on the FOTO to 72. progressing  2. Patient to demo increase in UE strength to 3+/5 in lower traps. progressing  3. Patient to perform daily activities including dead lifting up 65# or better and lifting up to 20 # overhead or better. progressing    Plan     Continue Plan of Care (POC) and progress per patient tolerance. See treatment section for details on planned progressions next session.      Radha Dickerson, PT

## 2023-12-13 ENCOUNTER — CLINICAL SUPPORT (OUTPATIENT)
Dept: REHABILITATION | Facility: HOSPITAL | Age: 28
End: 2023-12-13
Payer: COMMERCIAL

## 2023-12-13 DIAGNOSIS — M54.9 UPPER BACK PAIN: ICD-10-CM

## 2023-12-13 DIAGNOSIS — M54.2 NECK PAIN: ICD-10-CM

## 2023-12-13 DIAGNOSIS — M54.89 INTERSCAPULAR PAIN: Primary | ICD-10-CM

## 2023-12-13 PROCEDURE — 97112 NEUROMUSCULAR REEDUCATION: CPT | Mod: PN

## 2023-12-13 PROCEDURE — 97140 MANUAL THERAPY 1/> REGIONS: CPT | Mod: PN

## 2023-12-13 PROCEDURE — 97110 THERAPEUTIC EXERCISES: CPT | Mod: PN

## 2023-12-13 NOTE — PROGRESS NOTES
"[facility]   Physical Therapy Treatment Note        Name: Main Carlson [facility]  Clinic Number: [id number]    Therapy Diagnosis:   Encounter Diagnoses   Name Primary?    Interscapular pain Yes    Neck pain     Upper back pain        Physician: Sandra Huang MD    Visit Date: 12/13/2023     Physician Orders: PT Eval and Treat      Medical Diagnosis from Referral:   M54.2 (ICD-10-CM) - Neck pain   M54.9 (ICD-10-CM) - Upper back pain      Evaluation Date: 11/10/2023  Authorization Period Expiration: 12/31/23  Plan of Care Expiration: 1/18/24  Visit # / Visits authorized: 8/ 10  Foto: 3/3  Progress note due 30 days from 12/4/23     Precautions: Standard, pre diabetic, PCOS  PTA Visit #: 0/5     Time In: 10:00 am  Time Out: 10:45 am  Total Billable Time:45 minutes including tech assistance  Subjective     Patient reports: tingles in arms with a lot of retraction per pt but rare now.    She was compliant with home exercise program.  Response to previous treatment: no notable change  Functional change: no notable change    Pain: 0/10     Location: mid back , cervical spine    Objective    Deep neck flexor hold test 32 sec today (met)    Objective Measures updated at progress report or POC update only unless otherwise noted.       Treatment     Main received the treatments listed below:     MANUAL THERAPY TECHNIQUES were applied for (5) minutes, including:  PA to the mid and upper T spine, C 5/6/7 laterally grade II-III with one cavitation    THERAPEUTIC EXERCISES to develop strength, endurance, ROM, flexibility, posture, and core stabilization for 25 minutes including:    UBE 5' backward standing focus on wolff 10-18  Seated rows 55 # with PPT  3x 10 high   Trunk rotation setting 4 30 # 1x 20 B   Trunk extension machine 80 # 1x 10; 100# 1x20   Supine cervical flexion with chin tuck 2x 10    Deferred:  SL thoracic rotation 2" hold 15x B  Prayer stretch 30" 3x      NEUROMUSCULAR " RE-EDUCATION ACTIVITIES to improve Balance, Coordination, Kinesthetic, Sense, Proprioception, and Posture for (15) minutes.  The following were included:    C med x machine 189 # 1x 20, 1x 15; 348 seat with 2 inch seat and .5 counterbalance;  Prone T 3x 10  1#  Prone Y 3x 10 1#    Deferred:  Qped alternating arms with transverse abdominus engaged 3x 10 R then L  Qped alternating leg 1x 10 per leg  Hundreds 5x 50-60 reps  Hip extension on shuttle 2 bands 3x 10 per leg  Dead lift with trap bar only 3x 10  1/2 kneeling PPT with overhead press up 7 # 2x 10  1/2 kneeling PPT 2x 10  1/2 kneeling PPT with shoulder flexion with theraball; rotation with physioball 2x 10 per leg  add 4# ball 1x 10(to engage core)  Shoulder shrugs with trap bar 3x 6  Supine chin tuck with retraction (pressure stretch felt in posterior neck and consistent with expectations)  Cable Thoracic rotation right side 4x5 20#    PT educated using therapeutic activities and pt walking through the set up on ergonomic set up for her home office to reduce load and increase function for 0 minute with desk simulation    Patient Education and Home Exercises       Home Exercises Provided and Patient Education Provided     Education provided: (during therex)  PURPOSE: Patient educated on the impairments noted above and the effects of physical therapy intervention to improve overall condition and QOL.   Eduated pt on what is normal feelings with therex vs abnormal.    Written Home Exercises Provided: yes.  Exercises were reviewed and Main was able to demonstrate them prior to the end of the session.  Main demonstrated good  understanding of the education provided. See EMR under Patient Instructions for exercises provided during therapy sessions.    Assessment     Pt tolerated today's session with PT advancing scapular control more to engage her lower traps and reduce hiking. Pt is overall progressing as neck ROM and endurance increases but she still  fatigues a lot with light load. PT to advance core work and glute work to reduce lordosis and at some lat mobility work as she is tight.    Main is progressing well towards her goals.   Pt prognosis is Excellent.     Pt will continue to benefit from skilled outpatient physical therapy to address the deficits listed in the problem list box on initial evaluation, provide pt/family education and to maximize pt's level of independence in the home and community environment.     Pt's spiritual, cultural and educational needs considered and pt agreeable to plan of care and goals.     Anticipated Barriers for therapy: none    Goals:  Short Term Goals: In 4 weeks   1.Pt to be educated on HEP. met  2.Patient to increase cervical flexion to 60 degrees without sharp pain. progressing  3.Patient to increase strength to 4/5 in mid traps. met  4.Patient to have pain less than 2/10 or better at all times. met  5.Patient to improve score on the FOTO by 10%. met  6. Pt to be educated on postural and body mechanics awareness. met     Long Term Goals: In 10 weeks 1/18/24  1. Patient to improve score on the FOTO to 72. progressing  2. Patient to demo increase in UE strength to 3+/5 in lower traps. progressing  3. Patient to perform daily activities including dead lifting up 65# or better and lifting up to 20 # overhead or better. progressing    Plan     Continue Plan of Care (POC) and progress per patient tolerance. See treatment section for details on planned progressions next session.      Radha Dickerson, PT

## 2023-12-18 ENCOUNTER — CLINICAL SUPPORT (OUTPATIENT)
Dept: REHABILITATION | Facility: HOSPITAL | Age: 28
End: 2023-12-18
Payer: COMMERCIAL

## 2023-12-18 DIAGNOSIS — M54.89 INTERSCAPULAR PAIN: Primary | ICD-10-CM

## 2023-12-18 DIAGNOSIS — M54.9 UPPER BACK PAIN: ICD-10-CM

## 2023-12-18 DIAGNOSIS — M54.2 NECK PAIN: ICD-10-CM

## 2023-12-18 PROCEDURE — 97110 THERAPEUTIC EXERCISES: CPT | Mod: PN

## 2023-12-18 PROCEDURE — 97112 NEUROMUSCULAR REEDUCATION: CPT | Mod: PN

## 2023-12-18 NOTE — PROGRESS NOTES
"[facility]   Physical Therapy Treatment Note        Name: Main Carlson [facility]  Clinic Number: [id number]    Therapy Diagnosis:   Encounter Diagnoses   Name Primary?    Interscapular pain Yes    Neck pain     Upper back pain          Physician: Sandra Huang MD    Visit Date: 12/18/2023     Physician Orders: PT Eval and Treat      Medical Diagnosis from Referral:   M54.2 (ICD-10-CM) - Neck pain   M54.9 (ICD-10-CM) - Upper back pain      Evaluation Date: 11/10/2023  Authorization Period Expiration: 12/31/23  Plan of Care Expiration: 1/18/24  Visit # / Visits authorized: 8/ 10  Foto: 3/3  Progress note due 30 days from 12/4/23     Precautions: Standard, pre diabetic, PCOS  PTA Visit #: 0/5     Time In: 10:00 am  Time Out: 11:10  am  Total Billable Time: 70 minutes including tech assistance  Subjective     Patient reports: tingling is rare with function. Working out at the gym regularly.    She was compliant with home exercise program.  Response to previous treatment: no notable change  Functional change: no notable change    Pain: 0/10     Location: mid back , cervical spine    Objective        Objective Measures updated at progress report or POC update only unless otherwise noted.       Treatment     Main received the treatments listed below:     MANUAL THERAPY TECHNIQUES were applied for (0) minutes, including:    Deferred:  PA to the mid and upper T spine, C 5/6/7 laterally grade II-III with one cavitation    THERAPEUTIC EXERCISES to develop strength, endurance, ROM, flexibility, posture, and core stabilization for 40 minutes including:    UBE 5' backward standing focus on wolff 10-18  Seated rows 55 # with PPT  3x 10 high (increase on follow up)  Trunk extension machine 80 # 1x 10; 100# 1x20   Supine cervical flexion with chin tuck 2x 10  Trunk rotation setting 4 32 # 1x 20 B     Deferred:  SL thoracic rotation 2" hold 15x B  Prayer stretch 30" 3x      NEUROMUSCULAR " RE-EDUCATION ACTIVITIES to improve Balance, Coordination, Kinesthetic, Sense, Proprioception, and Posture for (30) minutes.  The following were included:    Hundreds 5x 60 reps  Hip extension on shuttle 2 bands 3x 10 per leg  Dead lift with trap bar only 1x10; 70# total 3x 6 (max cues to teach more external cueing to increase hip hinge 2x 5 and to train momentum to assist to engage glutes, hamstring more and posterior chain)      Deferred:    C med x machine 189 # 1x 20, 1x 15; 348 seat with 2 inch seat and .5 counterbalance;  Prone T 3x 10  1#  Prone Y 3x 10 1#  Qped alternating arms with transverse abdominus engaged 3x 10 R then L  Qped alternating leg 1x 10 per leg  1/2 kneeling PPT with overhead press up 7 # 2x 10  1/2 kneeling PPT 2x 10  1/2 kneeling PPT with shoulder flexion with theraball; rotation with physioball 2x 10 per leg  add 4# ball 1x 10(to engage core)  Shoulder shrugs with trap bar 3x 6  Supine chin tuck with retraction (pressure stretch felt in posterior neck and consistent with expectations)  Cable Thoracic rotation right side 4x5 20#    PT educated using therapeutic activities and pt walking through the set up on ergonomic set up for her home office to reduce load and increase function for 0 minute with desk simulation    Patient Education and Home Exercises       Home Exercises Provided and Patient Education Provided     Education provided: (during therex)  PURPOSE: Patient educated on the impairments noted above and the effects of physical therapy intervention to improve overall condition and QOL.   Eduated pt on what is normal feelings with therex vs abnormal.    Written Home Exercises Provided: yes.  Exercises were reviewed and Main was able to demonstrate them prior to the end of the session.  Main demonstrated good  understanding of the education provided. See EMR under Patient Instructions for exercises provided during therapy sessions.    Assessment     Pt tolerated today's  session with PT focusing on posterior chain and core more. Pt needs a lot of cues on hip hinge still or she doesn't get into much knee flexion. PT and pt discussed moving her to D/C on the follow up if all goes well so we can upgrade her core and scapular work prior to D/C and see how well she is doing with posterior chain control and core engagement as she still needs cues for quality at times but is improving.    Main is progressing well towards her goals.   Pt prognosis is Excellent.     Pt will continue to benefit from skilled outpatient physical therapy to address the deficits listed in the problem list box on initial evaluation, provide pt/family education and to maximize pt's level of independence in the home and community environment.     Pt's spiritual, cultural and educational needs considered and pt agreeable to plan of care and goals.     Anticipated Barriers for therapy: none    Goals:  Short Term Goals: In 4 weeks   1.Pt to be educated on HEP. met  2.Patient to increase cervical flexion to 60 degrees without sharp pain. progressing  3.Patient to increase strength to 4/5 in mid traps. met  4.Patient to have pain less than 2/10 or better at all times. met  5.Patient to improve score on the FOTO by 10%. met  6. Pt to be educated on postural and body mechanics awareness. met     Long Term Goals: In 10 weeks 1/18/24  1. Patient to improve score on the FOTO to 72. progressing  2. Patient to demo increase in UE strength to 3+/5 in lower traps. progressing  3. Patient to perform daily activities including dead lifting up 65# or better and lifting up to 20 # overhead or better. Met with dead lift; progressing overhead    Plan     Continue Plan of Care (POC) and progress per patient tolerance. See treatment section for details on planned progressions next session.      Radha Dickerson, PT

## 2023-12-20 ENCOUNTER — CLINICAL SUPPORT (OUTPATIENT)
Dept: REHABILITATION | Facility: HOSPITAL | Age: 28
End: 2023-12-20
Payer: COMMERCIAL

## 2023-12-20 DIAGNOSIS — M54.2 NECK PAIN: ICD-10-CM

## 2023-12-20 DIAGNOSIS — M54.9 UPPER BACK PAIN: ICD-10-CM

## 2023-12-20 DIAGNOSIS — M54.89 INTERSCAPULAR PAIN: Primary | ICD-10-CM

## 2023-12-20 PROCEDURE — 97110 THERAPEUTIC EXERCISES: CPT | Mod: PN

## 2023-12-20 PROCEDURE — 97112 NEUROMUSCULAR REEDUCATION: CPT | Mod: PN

## 2023-12-20 NOTE — PLAN OF CARE
[facility]   Physical Therapy Treatment Note and Discharge Note       Name: Main Carlson [facility]  Clinic Number: [id number]    Therapy Diagnosis:   Encounter Diagnoses   Name Primary?    Interscapular pain Yes    Neck pain     Upper back pain      Physician: Sandra Huang MD    Visit Date: 12/20/2023     Physician Orders: PT Eval and Treat      Medical Diagnosis from Referral:   M54.2 (ICD-10-CM) - Neck pain   M54.9 (ICD-10-CM) - Upper back pain      Evaluation Date: 11/10/2023  Authorization Period Expiration: 12/31/23  Plan of Care Expiration: 1/18/24  Visit # / Visits authorized: 11/ 10  Foto: 3/3     No show : 0  Missed visits:   0  Precautions: Standard, pre diabetic, PCOS  PTA Visit #: 0/5     Time In: 10:00 am  Time Out: 11:14  am  Total Billable Time: 74 minutes including tech assistance  Subjective     Patient reports:ready to d/c to self care today. Made changes at the gym as discussed.    She was compliant with home exercise program.  Response to previous treatment: no notable change  Functional change: no notable change    Pain: 0/10     Location: mid back , cervical spine    Objective    Cervical Spine AROM at eval and today    Degrees Pain   Flexion 50;60 n   Extend 60 n   R side bend 30;40  n   L side bend 30;40  n   R rotation 70,85 n   L rotation 80,85 n      T spine rotation R/L seated full but in open book 90 to the R and 80 % L; 100 % today B  Strength:  Muscle (Myotome) Right Left   Cervical Deep neck Flexor hold time  hold 15 sec/32 sec improved to 32 sec     Shoulder Abduction 5 5   Elbow Flexors (C5) 5 5   Wrist Extensors (C6) 5 5   Elbow Extensors (C7) 5 5   ER (arm at side) 4+;5 4+;5   IR (arm at side) 5 5   Thumb extensors 5 5   Intrinsic strength 5 5   Mid traps 3+/5; lower 2+/5 and improved to 4+/5 B       Objective Measures updated at progress report or POC update only unless otherwise noted.       Treatment     Main received the treatments  listed below:       THERAPEUTIC EXERCISES to develop strength, endurance, ROM, flexibility, posture, and core stabilization for 34 minutes including:    Shuttle 6 bands 3 min  Reviewed HEP   Table top hold 1x 5  Table top single leg taps 1x5  Single leg SLR hold with core engaged 5 x 10 sec per leg  Hundreds 5x 60 reps  Mountain climbers 1x 5  Reverse mountain climbers 1x 5      NEUROMUSCULAR RE-EDUCATION ACTIVITIES to improve Balance, Coordination, Kinesthetic, Sense, Proprioception, and Posture for (40) minutes.  The following were included:    PEC at wall 10 x 10 sec hold x 2 sets  PEC with rotation and exhale at wall 2x 10  Raman curl 15# 1x15  Sumo squat training  Sumo squat 20# per hand 3x 5 (cues on engaging glutes)  1/2 kneeling PPT 2x 10  1/2 kneeling PPT with overhead press up 7 # 2x 10  Overhead press up 10 # per arm  1x5        Patient Education and Home Exercises       Home Exercises Provided and Patient Education Provided     Education provided: (during therex)  PURPOSE: Patient educated on the impairments noted above and the effects of physical therapy intervention to improve overall condition and QOL.   Eduated pt on what is normal feelings with therex vs abnormal.    Written Home Exercises Provided: yes.  Exercises were reviewed and Main was able to demonstrate them prior to the end of the session.  Main demonstrated good  understanding of the education provided. See EMR under Patient Instructions for exercises provided during therapy sessions.    Assessment     Pt tolerated today's session with PT focusing on posterior chain and core more again to free up the spine and unload neural tension. Pt had much better lifting control than on prior sessions and needed few cues on quality. PT updated her HEP today and progressed her to more core work to engage the pelvis to free up her spine to improve her seated posture to reduce neck strain from her tendency to anterior tilt and lean  forward.  D/C to self care.    Main is progressing well towards her goals.   Pt prognosis is Excellent.     Pt will continue to benefit from skilled outpatient physical therapy to address the deficits listed in the problem list box on initial evaluation, provide pt/family education and to maximize pt's level of independence in the home and community environment.     Pt's spiritual, cultural and educational needs considered and pt agreeable to plan of care and goals.     Anticipated Barriers for therapy: none    Goals:  Short Term Goals: In 4 weeks   1.Pt to be educated on HEP. met  2.Patient to increase cervical flexion to 60 degrees without sharp pain.met  3.Patient to increase strength to 4/5 in mid traps. met  4.Patient to have pain less than 2/10 or better at all times. met  5.Patient to improve score on the FOTO by 10%. met  6. Pt to be educated on postural and body mechanics awareness. met     Long Term Goals: In 10 weeks 1/18/24  1. Patient to improve score on the FOTO to 72 met  2. Patient to demo increase in UE strength to 3+/5 in lower traps. met  3. Patient to perform daily activities including dead lifting up 65# or better and lifting up to 20 # overhead or better. Met with dead lift    Plan     Goals met. D/c to self care      Radha Dickerson, PT

## 2024-01-03 ENCOUNTER — OFFICE VISIT (OUTPATIENT)
Dept: INTERNAL MEDICINE | Facility: CLINIC | Age: 29
End: 2024-01-03
Payer: COMMERCIAL

## 2024-01-03 VITALS
HEART RATE: 97 BPM | DIASTOLIC BLOOD PRESSURE: 64 MMHG | TEMPERATURE: 97 F | HEIGHT: 61 IN | WEIGHT: 217.63 LBS | BODY MASS INDEX: 41.09 KG/M2 | SYSTOLIC BLOOD PRESSURE: 110 MMHG

## 2024-01-03 DIAGNOSIS — G89.29 CHRONIC PAIN OF RIGHT WRIST: Chronic | ICD-10-CM

## 2024-01-03 DIAGNOSIS — M25.632 STIFF WRIST, LEFT: Chronic | ICD-10-CM

## 2024-01-03 DIAGNOSIS — D50.9 IRON DEFICIENCY ANEMIA, UNSPECIFIED IRON DEFICIENCY ANEMIA TYPE: Primary | Chronic | ICD-10-CM

## 2024-01-03 DIAGNOSIS — M25.531 CHRONIC PAIN OF RIGHT WRIST: Chronic | ICD-10-CM

## 2024-01-03 PROCEDURE — 3078F DIAST BP <80 MM HG: CPT | Mod: CPTII,S$GLB,, | Performed by: FAMILY MEDICINE

## 2024-01-03 PROCEDURE — 1159F MED LIST DOCD IN RCRD: CPT | Mod: CPTII,S$GLB,, | Performed by: FAMILY MEDICINE

## 2024-01-03 PROCEDURE — 3074F SYST BP LT 130 MM HG: CPT | Mod: CPTII,S$GLB,, | Performed by: FAMILY MEDICINE

## 2024-01-03 PROCEDURE — 1160F RVW MEDS BY RX/DR IN RCRD: CPT | Mod: CPTII,S$GLB,, | Performed by: FAMILY MEDICINE

## 2024-01-03 PROCEDURE — 99214 OFFICE O/P EST MOD 30 MIN: CPT | Mod: S$GLB,,, | Performed by: FAMILY MEDICINE

## 2024-01-03 PROCEDURE — 3008F BODY MASS INDEX DOCD: CPT | Mod: CPTII,S$GLB,, | Performed by: FAMILY MEDICINE

## 2024-01-03 PROCEDURE — 99999 PR PBB SHADOW E&M-EST. PATIENT-LVL V: CPT | Mod: PBBFAC,,, | Performed by: FAMILY MEDICINE

## 2024-01-03 NOTE — PATIENT INSTRUCTIONS
Patient will schedule follow up with dr. Camarena to follow up on wrist.  I will reenter gi referral to follow up on anemia  Patient will continue to follow with hematology for anemia

## 2024-01-03 NOTE — PROGRESS NOTES
Subjective     Patient ID: Main Aguilar is a 28 y.o. female.    Chief Complaint: Follow-up    Patient presents for follow up. Patient states she completed physical therapy and neck and back pain have improved. Therapist did not work her wrist even though the order was placed. She can't fully apply pressure to the wrist without pain. Flexion and extension are still limited but better than before. She also saw hematology for anemia and was recommended to GI for egd/colo evaluation. She has not heard from gi for appointment. Still having problems taking metformin and iron together. Alternating days with medication.       Review of Systems   Constitutional: Negative.    HENT: Negative.     Eyes:  Negative for discharge and redness.   Respiratory: Negative.     Cardiovascular: Negative.  Negative for chest pain, palpitations and leg swelling.   Gastrointestinal: Negative.  Negative for constipation and diarrhea.   Musculoskeletal: Negative.  Negative for arthralgias and gait problem.   Neurological: Negative.  Negative for coordination difficulties.   Psychiatric/Behavioral: Negative.            Objective     Physical Exam  Vitals and nursing note reviewed.   Constitutional:       Appearance: Normal appearance. She is obese.   HENT:      Head: Normocephalic and atraumatic.   Eyes:      Extraocular Movements: Extraocular movements intact.   Pulmonary:      Effort: Pulmonary effort is normal.   Skin:     General: Skin is warm and dry.   Neurological:      General: No focal deficit present.      Mental Status: She is alert and oriented to person, place, and time.   Psychiatric:         Mood and Affect: Mood normal.         Behavior: Behavior normal.            Assessment and Plan     1. Iron deficiency anemia, unspecified iron deficiency anemia type  Comments:  cont with hematology, gi referral re-entered for egd/colo eval  Orders:  -     Ambulatory referral/consult to Gastroenterology; Future; Expected date:  01/10/2024    2. Stiff wrist, left  Comments:  recommend patient schedule follow up with dr. torrez, reordered pt for wrist  Orders:  -     Ambulatory referral/consult to Physical/Occupational Therapy; Future; Expected date: 01/10/2024    3. Chronic pain of right wrist  Comments:  pt reordered, follow up with dr torrez  Orders:  -     Ambulatory referral/consult to Physical/Occupational Therapy; Future; Expected date: 01/10/2024                 Follow up in about 8 weeks (around 2/28/2024).

## 2024-01-09 ENCOUNTER — CLINICAL SUPPORT (OUTPATIENT)
Dept: REHABILITATION | Facility: HOSPITAL | Age: 29
End: 2024-01-09
Payer: COMMERCIAL

## 2024-01-09 DIAGNOSIS — M25.632 STIFF WRIST, LEFT: Chronic | ICD-10-CM

## 2024-01-09 DIAGNOSIS — G89.29 CHRONIC PAIN OF RIGHT WRIST: Chronic | ICD-10-CM

## 2024-01-09 DIAGNOSIS — R29.898 DECREASED GRIP STRENGTH OF RIGHT HAND: ICD-10-CM

## 2024-01-09 DIAGNOSIS — M25.531 CHRONIC PAIN OF RIGHT WRIST: Chronic | ICD-10-CM

## 2024-01-09 DIAGNOSIS — M25.631 DECREASED RANGE OF MOTION OF RIGHT WRIST: Primary | ICD-10-CM

## 2024-01-09 PROCEDURE — 97165 OT EVAL LOW COMPLEX 30 MIN: CPT | Mod: PN

## 2024-01-09 PROCEDURE — 97535 SELF CARE MNGMENT TRAINING: CPT | Mod: PN

## 2024-01-09 PROCEDURE — 97110 THERAPEUTIC EXERCISES: CPT | Mod: PN

## 2024-01-09 NOTE — PROGRESS NOTES
" [facility]  Occupational Therapy Initial Evaluation     Date: 1/9/2024  Name: Main Carlson [facility]  Clinic Number: [id number]    Therapy Diagnosis:   Encounter Diagnoses   Name Primary?    Stiff wrist, left     Chronic pain of right wrist     Decreased range of motion of right wrist Yes    Decreased  strength of right hand      Physician: Carloz Huang*    Physician Orders: OT eval and tx  Medical Diagnosis: Stiff wrist, left [M25.632], Chronic pain of right wrist [M25.531, G89.29]   Evaluation Date: 1/9/2024  Insurance Authorization Period Expiration: 1/2/2025  Plan of Care Certification Period: 1/9/2024 to 2/23/2024    Visit # / Visits authorized: 1/1  FOTO: 1/3    Surgical Procedure: right dorsal wrist ganglion cyst excision  Date of Surgery: 9/11/2023     Date of Return to MD: PRN    Precautions:  Standard    Time In: 0805  Time Out: 0850  Total Appointment Time (timed & untimed codes): 45 minutes    Subjective     Involved Side: bilateral (right>left)  Dominant Side: Right    Date of Onset: right wrist surgery on 9/11/2023  Mechanism of Injury/ History of Current Condition: Pt reports hx of right wrist surgery to remove a dorsal ganglion cyst in September 2023. She reports that she still has not regained full right wrist ROM and that it still hurts to put pressure on it. Pt reports occasional left wrist pain but very mild and intermittent as compared to her right wrist. She reports no hx of injury or trauma to her left wrist.     Falls: no    Imaging: none since surgery    Previous Therapy: therapy    Patient's Goals for Therapy: "better motion and less pain"    Pain:  Functional Pain Scale Rating 0-10:   6/10 on average  2/10 at best  8/10 at worst  Location: right wrist  Description: Aching and Shooting  Aggravating Factors: use, weightbearing, lifting, grabbing  Easing Factors: rest    Functional Limitations/Social History:    Previous Functional Status: " "Independent with all ADL/IADL tasks     Current Functional Status: pain limits her ability to bear weight through her wrist and to wave    Home/Living environment: lives with their family     Driving: yes but mainly drives with her left hand    Leisure: enjoys going to the gym Crunch: lifting barbells and dumbbells    Occupation:   Working presently: employed  Duties: sits at desk, works on a computer all day        Past Medical History/Physical Systems Review:   Medical History:   Past Medical History:   Diagnosis Date    Asthma     Nontoxic single thyroid nodule     PCOS (polycystic ovarian syndrome)     Prediabetes     Seborrheic dermatitis        Surgical History:    has a past surgical history that includes Excision of ganglion of wrist (Right, 9/11/2023).    Medications:   has a current medication list which includes the following prescription(s): fluocinolone, iron-vitamin c 100-250 mg (icar-c), ketoconazole, ketoconazole, metformin, and triamcinolone acetonide 0.025%.    Allergies:   Review of patient's allergies indicates:   Allergen Reactions    Lactase Rash          Objective     Observation/Inspection: Skin intact    Sensation: Pt denies paresthesias. Pt denies hypersensitivity. Intact to light touch.    Scar: No tenderness to palpation. Scar is not thickened, raised, or adhered.        bilateral Upper Extremity Active Range of Motion:     Right Left   ROM (in degrees) 1/9/2024 1/9/2024   Pronation WNL WNL   Supination WNL WNL   Radial deviation 20 15   Ulnar deviation 40 45   Wrist flexion 42 68   Wrist extension 64 70       right Hand Active Range of Motion: WNL as compared to left hand  Able to form full composite, hook, and lumbrical fists  Able to oppose thumb to each digit and DPC, but pt reported "pulling and pressure" with movement       and Pinch Strength (in pounds, psi's):   Right Left    1/9/2024 1/9/2024    II 60 70   Lateral 12 10   Tripod 5 8   Tip 4 3       Intake Outcome Measure " for FOTO Wrist Survey    Therapist reviewed FOTO scores for Main Aguilar on 1/9/2024.   FOTO documents entered into [employer] - see Media section.    Intake Score: 52%     Predicted Functional Score: 71% in 9 visits    Treatment     Total Treatment time (time-based codes) separate from Evaluation: 20 minutes    Main received the treatments listed below:      therapeutic exercises to develop ROM and flexibility for 10 minutes including:  - HEP    self-care techniques to improve independence and safety with ADL/IADL tasks for 10 minutes including:  - use of heat for pain management and joint stiffness  - use of wrist strap for support with weightlifting  - computer workstation setup education for improved posture    Home Exercise Program/Education:    Education provided:   - Role of OT, goals for OT, scheduling/cancellations, therapy attendance policy    Written Home Exercises Provided: Yes  Exercises were reviewed and Main was able to demonstrate them prior to the end of the session. Main demonstrated good understanding of the education provided. See EMR under Patient Instructions for exercises provided during therapy sessions.     Pt was advised to perform these exercises free of pain, and to stop performing them if pain occurs.    Assessment     Main Aguilar is a 28 y.o. female referred to outpatient occupational therapy and presents with a medical diagnosis of Stiff wrist, left [M25.632], Chronic pain of right wrist [M25.531, G89.29] .  Patient presents with the following therapy deficits: Decreased ROM, Decreased  strength, Decreased pinch strength, Decreased muscle strength, Decreased functional hand use, Increased pain, Joint Stiffness, and Diminished/Impaired Coordination and demonstrates limitations as described in the chart below.     Following medical record review, it is determined that the pt will benefit from occupational therapy services in order to maximize pain  free and/or functional use of her right wrist and hand. The following goals were discussed with the patient and patient is in agreement with them as to be addressed in the treatment plan. The patient's rehab potential is Good.     Anticipated barriers to occupational therapy: delayed start to therapy post-operatively  Pt has no cultural, educational or language barriers to learning provided.    Medical Necessity is demonstrated by the following  Occupational Profile/History  Co-morbidities and personal factors that may impact the plan of care [x] LOW: Brief chart review  [] MODERATE: Expanded chart review   [] HIGH: Extensive chart review    Moderate / High Support Documentation: n/a     Examination  Performance deficits relating to physical, cognitive or psychosocial skills that result in activity limitations and/or participation restrictions  [] LOW: addressing 1-3 Performance deficits  [x] MODERATE: 3-5 Performance deficits  [] HIGH: 5+ Performance deficits (please support below)    Moderate / High Support Documentation:    Physical:  Joint Mobility  Muscle Power/Strength  Muscle Endurance   Strength  Pinch Strength  Gross Motor Coordination  Proprioception Functions  Pain    Cognitive:  Safety Awareness/Insight to Disability    Psychosocial:    Habits     Treatment Options [x] LOW: Limited options  [] MODERATE: Several options  [] HIGH: Multiple options      Decision Making/ Complexity Score: low       The following goals were discussed with the patient and patient is in agreement with them as to be addressed in the treatment plan.     Goals:   Short Term Goals: (4 weeks)  1. Pt will be independent with HEP in 2 visits.  2. Pt will report decreased right wrist pain to a 4/10 with ADL/IADL tasks.   3. Pt will increase R wrist flexion AROM by 10 degrees to enable dressing, grooming activities.  4. Pt will report an increase in FOTO intake score of > 55%, which would indicate an improvement in quality of  life.    Long Term Goals: (8 weeks)  1. Pt will report decreased right wrist pain to 1-2/10 with ADL/IADL tasks.   2. Pt will exhibit 65-75 degrees of R wrist flexion/extension to enable independence with self-care and meal preparation.  3. Pt will exhibit 70-80# of R  strength to allow a firm grasp on cooking utensils, steering wheel, etc.  4. Pt will exhibit 9# of R functional tripod pinch strength to allow writing, opening containers, and turning keys.  5. Pt will report an increase in FOTO intake score of > 65%, which would indicate an improvement in quality of life.      Plan   Plan of Care Certification: 1/9/2024 to 2/23/2024     Outpatient Occupational Therapy 1-2 times weekly for 6 weeks to include the following interventions PRN: Fluidotherapy, Manual Therapy, Moist Heat/ Ice, Neuromuscular Re-ed, Orthotic Management and Training, Paraffin, Patient Education, Self Care, Therapeutic Activities, Therapeutic Exercise, and Ultrasound      EVELYN Park, ERNESTINE, DINAHT

## 2024-01-09 NOTE — PLAN OF CARE
" [facility]  Occupational Therapy Initial Evaluation     Date: 1/9/2024  Name: aMin Carlson [facility]  Clinic Number: [id number]    Therapy Diagnosis:   Encounter Diagnoses   Name Primary?    Stiff wrist, left     Chronic pain of right wrist     Decreased range of motion of right wrist Yes    Decreased  strength of right hand      Physician: Carloz Huang*    Physician Orders: OT eval and tx  Medical Diagnosis: Stiff wrist, left [M25.632], Chronic pain of right wrist [M25.531, G89.29]   Evaluation Date: 1/9/2024  Insurance Authorization Period Expiration: 1/2/2025  Plan of Care Certification Period: 1/9/2024 to 2/23/2024    Visit # / Visits authorized: 1/1  FOTO: 1/3    Surgical Procedure: right dorsal wrist ganglion cyst excision  Date of Surgery: 9/11/2023     Date of Return to MD: PRN    Precautions:  Standard    Time In: 0805  Time Out: 0850  Total Appointment Time (timed & untimed codes): 45 minutes    Subjective     Involved Side: bilateral (right>left)  Dominant Side: Right    Date of Onset: right wrist surgery on 9/11/2023  Mechanism of Injury/ History of Current Condition: Pt reports hx of right wrist surgery to remove a dorsal ganglion cyst in September 2023. She reports that she still has not regained full right wrist ROM and that it still hurts to put pressure on it. Pt reports occasional left wrist pain but very mild and intermittent as compared to her right wrist. She reports no hx of injury or trauma to her left wrist.     Falls: no    Imaging: none since surgery    Previous Therapy: therapy    Patient's Goals for Therapy: "better motion and less pain"    Pain:  Functional Pain Scale Rating 0-10:   6/10 on average  2/10 at best  8/10 at worst  Location: right wrist  Description: Aching and Shooting  Aggravating Factors: use, weightbearing, lifting, grabbing  Easing Factors: rest    Functional Limitations/Social History:    Previous Functional Status: " "Independent with all ADL/IADL tasks     Current Functional Status: pain limits her ability to bear weight through her wrist and to wave    Home/Living environment: lives with their family     Driving: yes but mainly drives with her left hand    Leisure: enjoys going to the gym Crunch: lifting barbells and dumbbells    Occupation:   Working presently: employed  Duties: sits at desk, works on a computer all day        Past Medical History/Physical Systems Review:   Medical History:   Past Medical History:   Diagnosis Date    Asthma     Nontoxic single thyroid nodule     PCOS (polycystic ovarian syndrome)     Prediabetes     Seborrheic dermatitis        Surgical History:    has a past surgical history that includes Excision of ganglion of wrist (Right, 9/11/2023).    Medications:   has a current medication list which includes the following prescription(s): fluocinolone, iron-vitamin c 100-250 mg (icar-c), ketoconazole, ketoconazole, metformin, and triamcinolone acetonide 0.025%.    Allergies:   Review of patient's allergies indicates:   Allergen Reactions    Lactase Rash          Objective     Observation/Inspection: Skin intact    Sensation: Pt denies paresthesias. Pt denies hypersensitivity. Intact to light touch.    Scar: No tenderness to palpation. Scar is not thickened, raised, or adhered.        bilateral Upper Extremity Active Range of Motion:     Right Left   ROM (in degrees) 1/9/2024 1/9/2024   Pronation WNL WNL   Supination WNL WNL   Radial deviation 20 15   Ulnar deviation 40 45   Wrist flexion 42 68   Wrist extension 64 70       right Hand Active Range of Motion: WNL as compared to left hand  Able to form full composite, hook, and lumbrical fists  Able to oppose thumb to each digit and DPC, but pt reported "pulling and pressure" with movement       and Pinch Strength (in pounds, psi's):   Right Left    1/9/2024 1/9/2024    II 60 70   Lateral 12 10   Tripod 5 8   Tip 4 3       Intake Outcome Measure " for FOTO Wrist Survey    Therapist reviewed FOTO scores for Main Aguilar on 1/9/2024.   FOTO documents entered into [employer] - see Media section.    Intake Score: 52%     Predicted Functional Score: 71% in 9 visits    Treatment     Total Treatment time (time-based codes) separate from Evaluation: 20 minutes    Main received the treatments listed below:      therapeutic exercises to develop ROM and flexibility for 10 minutes including:  - HEP    self-care techniques to improve independence and safety with ADL/IADL tasks for 10 minutes including:  - use of heat for pain management and joint stiffness  - use of wrist strap for support with weightlifting  - computer workstation setup education for improved posture    Home Exercise Program/Education:    Education provided:   - Role of OT, goals for OT, scheduling/cancellations, therapy attendance policy    Written Home Exercises Provided: Yes  Exercises were reviewed and Main was able to demonstrate them prior to the end of the session. Main demonstrated good understanding of the education provided. See EMR under Patient Instructions for exercises provided during therapy sessions.     Pt was advised to perform these exercises free of pain, and to stop performing them if pain occurs.    Assessment     Main Aguilar is a 28 y.o. female referred to outpatient occupational therapy and presents with a medical diagnosis of Stiff wrist, left [M25.632], Chronic pain of right wrist [M25.531, G89.29] .  Patient presents with the following therapy deficits: Decreased ROM, Decreased  strength, Decreased pinch strength, Decreased muscle strength, Decreased functional hand use, Increased pain, Joint Stiffness, and Diminished/Impaired Coordination and demonstrates limitations as described in the chart below.     Following medical record review, it is determined that the pt will benefit from occupational therapy services in order to maximize pain  free and/or functional use of her right wrist and hand. The following goals were discussed with the patient and patient is in agreement with them as to be addressed in the treatment plan. The patient's rehab potential is Good.     Anticipated barriers to occupational therapy: delayed start to therapy post-operatively  Pt has no cultural, educational or language barriers to learning provided.    Medical Necessity is demonstrated by the following  Occupational Profile/History  Co-morbidities and personal factors that may impact the plan of care [x] LOW: Brief chart review  [] MODERATE: Expanded chart review   [] HIGH: Extensive chart review    Moderate / High Support Documentation: n/a     Examination  Performance deficits relating to physical, cognitive or psychosocial skills that result in activity limitations and/or participation restrictions  [] LOW: addressing 1-3 Performance deficits  [x] MODERATE: 3-5 Performance deficits  [] HIGH: 5+ Performance deficits (please support below)    Moderate / High Support Documentation:    Physical:  Joint Mobility  Muscle Power/Strength  Muscle Endurance   Strength  Pinch Strength  Gross Motor Coordination  Proprioception Functions  Pain    Cognitive:  Safety Awareness/Insight to Disability    Psychosocial:    Habits     Treatment Options [x] LOW: Limited options  [] MODERATE: Several options  [] HIGH: Multiple options      Decision Making/ Complexity Score: low       The following goals were discussed with the patient and patient is in agreement with them as to be addressed in the treatment plan.     Goals:   Short Term Goals: (4 weeks)  1. Pt will be independent with HEP in 2 visits.  2. Pt will report decreased right wrist pain to a 4/10 with ADL/IADL tasks.   3. Pt will increase R wrist flexion AROM by 10 degrees to enable dressing, grooming activities.  4. Pt will report an increase in FOTO intake score of > 55%, which would indicate an improvement in quality of  life.    Long Term Goals: (8 weeks)  1. Pt will report decreased right wrist pain to 1-2/10 with ADL/IADL tasks.   2. Pt will exhibit 65-75 degrees of R wrist flexion/extension to enable independence with self-care and meal preparation.  3. Pt will exhibit 70-80# of R  strength to allow a firm grasp on cooking utensils, steering wheel, etc.  4. Pt will exhibit 9# of R functional tripod pinch strength to allow writing, opening containers, and turning keys.  5. Pt will report an increase in FOTO intake score of > 65%, which would indicate an improvement in quality of life.      Plan   Plan of Care Certification: 1/9/2024 to 2/23/2024     Outpatient Occupational Therapy 1-2 times weekly for 6 weeks to include the following interventions PRN: Fluidotherapy, Manual Therapy, Moist Heat/ Ice, Neuromuscular Re-ed, Orthotic Management and Training, Paraffin, Patient Education, Self Care, Therapeutic Activities, Therapeutic Exercise, and Ultrasound      EVELYN Park, ERNESTINE, DINAHT

## 2024-01-12 ENCOUNTER — CLINICAL SUPPORT (OUTPATIENT)
Dept: REHABILITATION | Facility: HOSPITAL | Age: 29
End: 2024-01-12
Payer: COMMERCIAL

## 2024-01-12 DIAGNOSIS — R29.898 DECREASED GRIP STRENGTH OF RIGHT HAND: ICD-10-CM

## 2024-01-12 DIAGNOSIS — M25.631 DECREASED RANGE OF MOTION OF RIGHT WRIST: Primary | ICD-10-CM

## 2024-01-12 PROCEDURE — 97110 THERAPEUTIC EXERCISES: CPT | Mod: PN

## 2024-01-12 PROCEDURE — 97018 PARAFFIN BATH THERAPY: CPT | Mod: PN

## 2024-01-12 PROCEDURE — 97530 THERAPEUTIC ACTIVITIES: CPT | Mod: PN

## 2024-01-12 NOTE — PROGRESS NOTES
"  [facility]  Occupational Therapy Treatment Note     Date: 1/12/2024  Name: Main Carlson [facility]  Clinic Number: [id number]    Therapy Diagnosis:   Encounter Diagnoses   Name Primary?    Decreased range of motion of right wrist Yes    Decreased  strength of right hand      Physician: Carloz Huang*    Physician Orders: OT eval and tx  Medical Diagnosis: Stiff wrist, left [M25.632], Chronic pain of right wrist [M25.531, G89.29]   Evaluation Date: 1/9/2024  Insurance Authorization Period Expiration: 1/3/2026  Plan of Care Certification Period: 1/9/2024 to 2/23/2024     Visit # / Visits authorized: 1/20  FOTO: 1/3     Surgical Procedure: right dorsal wrist ganglion cyst excision  Date of Surgery: 9/11/2023                             Date of Return to MD: PRN     Precautions:  Standard    Time In: 0710  Time Out: 0800  Total Billable Time: 50 minutes    Subjective     Pt reports: "I'm a little more sore but I did my exercises."    she was compliant with home exercise program given on evaluation.  Response to previous treatment: good  Functional change: improved right wrist motion    Pain: 6/10 (6/10 on evaluation)  Location: right wrist    Objective   Objective measures updated at progress report unless specified.    Treatment     Main received the treatments listed below:      therapeutic exercises to develop strength and ROM of RUE for 10 minutes including:  - wrist flexor and extensor stretching x 30 sec holds each, 2 rounds  - wrist AROM with 2# DB, forearm 3 positions over bolster, x 20 reps each    manual therapy techniques were applied to RUE for 0 minutes including:  - NT    neuromuscular re-education activities to improve Coordination and Proprioception of RUE for 0 minutes including:  - NT    therapeutic activities to improve functional performance for 30 minutes including:  - forearm rotations with 1# pronator wand (hand 1/2 way up handle) x 20 reps  - " composite  strengthening with blue digiciser 5x10  - lateral and 3pt pinch strengthening with green clothespin x 20 reps each  - 2pt pinch strengthening with red clothespin x 20 reps  - red putty exercises: gripping, pinching, finding/removing beads    direct contact modalities after being cleared for contraindications for 0 minutes including:  - NT    supervised modalities after being cleared for contradictions for 10 minutes including:  - paraffin to right wrist/hand (with simultaneous MHP) at start of session 2* report of pain and soreness    Home Exercises and Education Provided     Education provided:   - reviewed HEP issued at evaluation  - progress toward goals    Written Home Exercises Provided: Patient instructed to cont prior HEP  Exercises were reviewed and Main was able to demonstrate them prior to the end of the session. Main demonstrated good understanding of the HEP provided.     See EMR under Patient Instructions for exercises provided during prior visit.        Assessment     Main would continue to benefit from skilled OT. She was seen for her first treatment session on this date. She reported increased right wrist soreness upon arrival but tolerated session well. She has been compliant with her HEP and she tolerated exercises well.     Main is progressing towards her goals and there are no updates to goals at this time. Pt prognosis is Good.     Pt will continue to benefit from skilled outpatient occupational therapy to address the deficits listed in the problem list on initial evaluation provide pt/family education and to maximize pt's level of independence in the home and community environment.     Pt's spiritual, cultural and educational needs considered and pt agreeable to plan of care and goals.    Anticipated barriers to occupational therapy: delayed start to therapy post-operatively     Goals:  Short Term Goals: (4 weeks)  1. Pt will be independent with HEP in 2  visits. - progressing, continue goal 1/12/2024  2. Pt will report decreased right wrist pain to a 4/10 with ADL/IADL tasks.   3. Pt will increase R wrist flexion AROM by 10 degrees to enable dressing, grooming activities.  4. Pt will report an increase in FOTO intake score of > 55%, which would indicate an improvement in quality of life.     Long Term Goals: (8 weeks)  1. Pt will report decreased right wrist pain to 1-2/10 with ADL/IADL tasks.   2. Pt will exhibit 65-75 degrees of R wrist flexion/extension to enable independence with self-care and meal preparation.  3. Pt will exhibit 70-80# of R  strength to allow a firm grasp on cooking utensils, steering wheel, etc.  4. Pt will exhibit 9# of R functional tripod pinch strength to allow writing, opening containers, and turning keys.  5. Pt will report an increase in FOTO intake score of > 65%, which would indicate an improvement in quality of life.    Plan     Plan of Care Certification: 1/9/2024 to 2/23/2024      Outpatient Occupational Therapy 1-2 times weekly for 6 weeks to include the following interventions PRN: Fluidotherapy, Manual Therapy, Moist Heat/ Ice, Neuromuscular Re-ed, Orthotic Management and Training, Paraffin, Patient Education, Self Care, Therapeutic Activities, Therapeutic Exercise, and Ultrasound    Updates/Grading for next session: progress occupational therapy as tolerated    EVELYN Park, ERNESTINE, DINAHT

## 2024-01-19 ENCOUNTER — CLINICAL SUPPORT (OUTPATIENT)
Dept: REHABILITATION | Facility: HOSPITAL | Age: 29
End: 2024-01-19
Payer: COMMERCIAL

## 2024-01-19 DIAGNOSIS — M25.631 DECREASED RANGE OF MOTION OF RIGHT WRIST: Primary | ICD-10-CM

## 2024-01-19 DIAGNOSIS — R29.898 DECREASED GRIP STRENGTH OF RIGHT HAND: ICD-10-CM

## 2024-01-19 PROCEDURE — 97112 NEUROMUSCULAR REEDUCATION: CPT | Mod: PN

## 2024-01-19 PROCEDURE — 97530 THERAPEUTIC ACTIVITIES: CPT | Mod: PN

## 2024-01-19 PROCEDURE — 97140 MANUAL THERAPY 1/> REGIONS: CPT | Mod: PN

## 2024-01-19 PROCEDURE — 97110 THERAPEUTIC EXERCISES: CPT | Mod: PN

## 2024-01-19 NOTE — PROGRESS NOTES
"  [facility]up[facility]patient [facility]  Occupational Therapy Treatment Note     Date: 1/19/2024  Name: Main Carlson [facility]  Clinic Number: [id number]    Therapy Diagnosis:   Encounter Diagnoses   Name Primary?    Decreased range of motion of right wrist Yes    Decreased  strength of right hand      Physician: Carloz Huang*    Physician Orders: OT eval and tx  Medical Diagnosis: Stiff wrist, left [M25.632], Chronic pain of right wrist [M25.531, G89.29]   Evaluation Date: 1/9/2024  Insurance Authorization Period Expiration: 1/3/2026  Plan of Care Certification Period: 1/9/2024 to 2/23/2024     Visit # / Visits authorized: 2/20  FOTO: 1/3     Surgical Procedure: right dorsal wrist ganglion cyst excision  Date of Surgery: 9/11/2023                             Date of Return to MD: PRN     Precautions:  Standard    Time In: 0818  Time Out: 0900  Total Billable Time: 42 minutes    Subjective     Pt reports: "My wrist is feeling a little better. I've been trying to use it more. I've been doing my exercises. I ordered that wrist band but it hasn't come in yet. I don't have any pain at rest."    she was compliant with home exercise program given on evaluation.  Response to previous treatment: good  Functional change: decreased pain    Pain Pre-treatment: 0/10   Pain Post-treatment: 2/10 - (muscle soreness)  Location: right wrist    Objective   Objective measures updated at progress report unless specified.    Treatment     Main received the treatments listed below:      therapeutic exercises to develop strength and ROM of RUE for 8 minutes including:  - wrist flexor and extensor stretching x 30 sec holds each, 2 rounds  - wrist AROM with 2# DB, forearm 3 positions over bolster, x 20 reps each    manual therapy techniques were applied to RUE for 8 minutes including:  - IASTM over dorsum of hand and wrist to increase circulation and tissue extensibility    neuromuscular re-education activities to " improve posture, coordination, and proprioception of RUE for 8 minutes including:  - bilateral scap retraction AROM 2x10  - bilateral overhead reach with scap depression and retraction on the return down 2x10    therapeutic activities to improve functional performance for 18 minutes including:  - forearm rotations with 1# pronator wand (hand 1/4 way up handle) x 20 reps  - composite  strengthening with blue digiciser 5x10  - lateral and 3pt pinch strengthening with blue clothespin x 20 reps each  - 2pt pinch strengthening with green clothespin x 20 reps    direct contact modalities after being cleared for contraindications for 0 minutes including:  - NT    supervised modalities after being cleared for contradictions for 0 minutes including:    Home Exercises and Education Provided     Education provided:   - progress toward goals    Written Home Exercises Provided: Patient instructed to cont prior HEP  Exercises were reviewed and Main was able to demonstrate them prior to the end of the session. Main demonstrated good understanding of the HEP provided.     See EMR under Patient Instructions for exercises provided during prior visit.        Assessment     Main would continue to benefit from skilled OT. She was seen for her 2nd treatment session on this date. She has been compliant with her HEP and reported no pain upon arrival. She tolerated PRE's well during session and reported only light muscle soreness at end of session.    Main is progressing towards her goals and there are no updates to goals at this time. Pt prognosis is Good.     Pt will continue to benefit from skilled outpatient occupational therapy to address the deficits listed in the problem list on initial evaluation provide pt/family education and to maximize pt's level of independence in the home and community environment.     Pt's spiritual, cultural and educational needs considered and pt agreeable to plan of care and  goals.    Anticipated barriers to occupational therapy: delayed start to therapy post-operatively     Goals:  Short Term Goals: (4 weeks)  1. Pt will be independent with HEP in 2 visits. - MET 1/19/2024  2. Pt will report decreased right wrist pain to a 4/10 with ADL/IADL tasks. - MET 1/19/2024  3. Pt will increase R wrist flexion AROM by 10 degrees to enable dressing, grooming activities.  4. Pt will report an increase in FOTO intake score of > 55%, which would indicate an improvement in quality of life.     Long Term Goals: (8 weeks)  1. Pt will report decreased right wrist pain to 1-2/10 with ADL/IADL tasks. - MET 1/19/2024   2. Pt will exhibit 65-75 degrees of R wrist flexion/extension to enable independence with self-care and meal preparation.  3. Pt will exhibit 70-80# of R  strength to allow a firm grasp on cooking utensils, steering wheel, etc.  4. Pt will exhibit 9# of R functional tripod pinch strength to allow writing, opening containers, and turning keys.  5. Pt will report an increase in FOTO intake score of > 65%, which would indicate an improvement in quality of life.    Plan     Plan of Care Certification: 1/9/2024 to 2/23/2024      Outpatient Occupational Therapy 1-2 times weekly for 6 weeks to include the following interventions PRN: Fluidotherapy, Manual Therapy, Moist Heat/ Ice, Neuromuscular Re-ed, Orthotic Management and Training, Paraffin, Patient Education, Self Care, Therapeutic Activities, Therapeutic Exercise, and Ultrasound    Updates/Grading for next session: progress occupational therapy as tolerated    EVELYN Park LOTR, DINAHT

## 2024-01-22 ENCOUNTER — CLINICAL SUPPORT (OUTPATIENT)
Dept: REHABILITATION | Facility: HOSPITAL | Age: 29
End: 2024-01-22
Payer: COMMERCIAL

## 2024-01-22 DIAGNOSIS — M25.631 DECREASED RANGE OF MOTION OF RIGHT WRIST: Primary | ICD-10-CM

## 2024-01-22 DIAGNOSIS — R29.898 DECREASED GRIP STRENGTH OF RIGHT HAND: ICD-10-CM

## 2024-01-22 PROCEDURE — 97530 THERAPEUTIC ACTIVITIES: CPT | Mod: PN

## 2024-01-22 PROCEDURE — 97110 THERAPEUTIC EXERCISES: CPT | Mod: PN

## 2024-01-22 PROCEDURE — 97018 PARAFFIN BATH THERAPY: CPT | Mod: PN

## 2024-01-22 NOTE — PROGRESS NOTES
"  Occupational Outpatient Therapy and [facility]ness  Occupational Therapy Treatment Note     Date: 1/22/2024  Name: Main Carlson [facility]  Clinic Number: [id number]    Therapy Diagnosis:   Encounter Diagnoses   Name Primary?    Decreased range of motion of right wrist Yes    Decreased  strength of right hand      Physician: Carloz Huang*    Physician Orders: OT eval and tx  Medical Diagnosis: Stiff wrist, left [M25.632], Chronic pain of right wrist [M25.531, G89.29]   Evaluation Date: 1/9/2024  Insurance Authorization Period Expiration: 1/3/2026  Plan of Care Certification Period: 1/9/2024 to 2/23/2024     Visit # / Visits authorized: 3/20  FOTO: 1/3     Surgical Procedure: right dorsal wrist ganglion cyst excision  Date of Surgery: 9/11/2023                             Date of Return to MD: PRN     Precautions:  Standard    Time In: 0810 (pt was 10 min late to apt)  Time Out: 0900  Total Billable Time: 45 minutes    Subjective     Pt reports: "I don't feel any different since last session 3 days ago."    she was compliant with home exercise program given on evaluation.  Response to previous treatment: fair  Functional change: none new on this date    Pain Pre-treatment: 2/10   Pain Post-treatment: 2/10 - (muscle soreness)  Location: right wrist    Objective   Objective measures updated at progress report unless specified.    Treatment     Main received the treatments listed below:      therapeutic exercises to develop strength and ROM of RUE for 10 minutes including:  - wrist flexor and extensor stretching x 30 sec holds each, 2 rounds  - wrist AROM with 2# DB, forearm 3 positions over bolster, x 20 reps each  - composite, hook, and lumbrical fisting AROM x 20 reps each     manual therapy techniques were applied to RUE for 0 minutes including:    neuromuscular re-education activities to improve posture, coordination, and proprioception of RUE for 0 minutes including:    therapeutic activities to " improve functional performance for 25 minutes including:  - forearm rotations with 1# pronator wand (hand 1/4 way up handle) x 20 reps  - composite  strengthening with blue digiciser 5x10  - lateral and 3pt pinch strengthening with blue clothespin x 20 reps each  - 2pt pinch strengthening with green clothespin x 20 reps  - red putty exercises: gripping, pinching, finding/removing beads    direct contact modalities after being cleared for contraindications for 0 minutes including:  - NT    supervised modalities after being cleared for contradictions for 10 minutes including:  - paraffin to right wrist/hand (with simultaneous MHP) at start of session 2* report of pain and soreness    Home Exercises and Education Provided     Education provided:   - progress toward goals    Written Home Exercises Provided: Patient instructed to cont prior HEP  Exercises were reviewed and Main was able to demonstrate them prior to the end of the session. Main demonstrated good understanding of the HEP provided.     See EMR under Patient Instructions for exercises provided during prior visit.        Assessment     Main would continue to benefit from skilled OT. She was seen for her 3rd treatment session on this date. Despite report of lack of change since last session, she tolerated session well and is overall exhibiting improved left forearm and wrist ROM.    Main is progressing towards her goals and there are no updates to goals at this time. Pt prognosis is Good.     Pt will continue to benefit from skilled outpatient occupational therapy to address the deficits listed in the problem list on initial evaluation provide pt/family education and to maximize pt's level of independence in the home and community environment.     Pt's spiritual, cultural and educational needs considered and pt agreeable to plan of care and goals.    Anticipated barriers to occupational therapy: delayed start to therapy  post-operatively     Goals:  Short Term Goals: (4 weeks)  1. Pt will be independent with HEP in 2 visits. - MET 1/19/2024  2. Pt will report decreased right wrist pain to a 4/10 with ADL/IADL tasks. - MET 1/19/2024  3. Pt will increase R wrist flexion AROM by 10 degrees to enable dressing, grooming activities.  4. Pt will report an increase in FOTO intake score of > 55%, which would indicate an improvement in quality of life.     Long Term Goals: (8 weeks)  1. Pt will report decreased right wrist pain to 1-2/10 with ADL/IADL tasks. - MET 1/19/2024   2. Pt will exhibit 65-75 degrees of R wrist flexion/extension to enable independence with self-care and meal preparation.  3. Pt will exhibit 70-80# of R  strength to allow a firm grasp on cooking utensils, steering wheel, etc.  4. Pt will exhibit 9# of R functional tripod pinch strength to allow writing, opening containers, and turning keys.  5. Pt will report an increase in FOTO intake score of > 65%, which would indicate an improvement in quality of life.    Plan     Plan of Care Certification: 1/9/2024 to 2/23/2024      Outpatient Occupational Therapy 1-2 times weekly for 6 weeks to include the following interventions PRN: Fluidotherapy, Manual Therapy, Moist Heat/ Ice, Neuromuscular Re-ed, Orthotic Management and Training, Paraffin, Patient Education, Self Care, Therapeutic Activities, Therapeutic Exercise, and Ultrasound    Updates/Grading for next session: progress occupational therapy as tolerated - add in weightbearing/loading activities    EVELYN Park, ERNESTINE, DINAHT

## 2024-02-26 ENCOUNTER — LAB VISIT (OUTPATIENT)
Dept: LAB | Facility: HOSPITAL | Age: 29
End: 2024-02-26
Attending: INTERNAL MEDICINE
Payer: COMMERCIAL

## 2024-02-26 DIAGNOSIS — D50.9 IRON DEFICIENCY ANEMIA, UNSPECIFIED IRON DEFICIENCY ANEMIA TYPE: ICD-10-CM

## 2024-02-26 LAB
FERRITIN SERPL-MCNC: 3 NG/ML (ref 20–300)
IRON SERPL-MCNC: 19 UG/DL (ref 30–160)
IRON SERPL-MCNC: 19 UG/DL (ref 30–160)
RETICS/RBC NFR AUTO: 1.1 % (ref 0.5–2.5)
SATURATED IRON: 4 % (ref 20–50)
TOTAL IRON BINDING CAPACITY: 521 UG/DL (ref 250–450)
TRANSFERRIN SERPL-MCNC: 352 MG/DL (ref 200–375)

## 2024-02-26 PROCEDURE — 82728 ASSAY OF FERRITIN: CPT | Performed by: INTERNAL MEDICINE

## 2024-02-26 PROCEDURE — 83540 ASSAY OF IRON: CPT | Performed by: INTERNAL MEDICINE

## 2024-02-26 PROCEDURE — 85045 AUTOMATED RETICULOCYTE COUNT: CPT | Performed by: INTERNAL MEDICINE

## 2024-02-26 PROCEDURE — 36415 COLL VENOUS BLD VENIPUNCTURE: CPT | Performed by: INTERNAL MEDICINE

## 2024-03-01 ENCOUNTER — OFFICE VISIT (OUTPATIENT)
Dept: HEMATOLOGY/ONCOLOGY | Facility: CLINIC | Age: 29
End: 2024-03-01
Payer: COMMERCIAL

## 2024-03-01 ENCOUNTER — LAB VISIT (OUTPATIENT)
Dept: LAB | Facility: HOSPITAL | Age: 29
End: 2024-03-01
Payer: COMMERCIAL

## 2024-03-01 VITALS
BODY MASS INDEX: 40.99 KG/M2 | HEIGHT: 61 IN | TEMPERATURE: 97 F | WEIGHT: 217.13 LBS | HEART RATE: 84 BPM | DIASTOLIC BLOOD PRESSURE: 61 MMHG | OXYGEN SATURATION: 100 % | SYSTOLIC BLOOD PRESSURE: 109 MMHG

## 2024-03-01 DIAGNOSIS — D50.9 IRON DEFICIENCY ANEMIA, UNSPECIFIED IRON DEFICIENCY ANEMIA TYPE: Primary | ICD-10-CM

## 2024-03-01 DIAGNOSIS — D50.9 IRON DEFICIENCY ANEMIA, UNSPECIFIED IRON DEFICIENCY ANEMIA TYPE: ICD-10-CM

## 2024-03-01 LAB
BASOPHILS # BLD AUTO: 0.05 K/UL (ref 0–0.2)
BASOPHILS NFR BLD: 1 % (ref 0–1.9)
DIFFERENTIAL METHOD BLD: ABNORMAL
EOSINOPHIL # BLD AUTO: 0.2 K/UL (ref 0–0.5)
EOSINOPHIL NFR BLD: 3.5 % (ref 0–8)
ERYTHROCYTE [DISTWIDTH] IN BLOOD BY AUTOMATED COUNT: 17 % (ref 11.5–14.5)
HCT VFR BLD AUTO: 29.1 % (ref 37–48.5)
HGB BLD-MCNC: 8.7 G/DL (ref 12–16)
IMM GRANULOCYTES # BLD AUTO: 0.01 K/UL (ref 0–0.04)
IMM GRANULOCYTES NFR BLD AUTO: 0.2 % (ref 0–0.5)
LYMPHOCYTES # BLD AUTO: 2.7 K/UL (ref 1–4.8)
LYMPHOCYTES NFR BLD: 52.2 % (ref 18–48)
MCH RBC QN AUTO: 21.7 PG (ref 27–31)
MCHC RBC AUTO-ENTMCNC: 29.9 G/DL (ref 32–36)
MCV RBC AUTO: 73 FL (ref 82–98)
MONOCYTES # BLD AUTO: 0.5 K/UL (ref 0.3–1)
MONOCYTES NFR BLD: 10.4 % (ref 4–15)
NEUTROPHILS # BLD AUTO: 1.7 K/UL (ref 1.8–7.7)
NEUTROPHILS NFR BLD: 32.7 % (ref 38–73)
NRBC BLD-RTO: 0 /100 WBC
PLATELET # BLD AUTO: 454 K/UL (ref 150–450)
PMV BLD AUTO: 8.9 FL (ref 9.2–12.9)
RBC # BLD AUTO: 4.01 M/UL (ref 4–5.4)
WBC # BLD AUTO: 5.21 K/UL (ref 3.9–12.7)

## 2024-03-01 PROCEDURE — 85025 COMPLETE CBC W/AUTO DIFF WBC: CPT

## 2024-03-01 PROCEDURE — 3008F BODY MASS INDEX DOCD: CPT | Mod: CPTII,S$GLB,,

## 2024-03-01 PROCEDURE — 99214 OFFICE O/P EST MOD 30 MIN: CPT | Mod: S$GLB,,,

## 2024-03-01 PROCEDURE — 36415 COLL VENOUS BLD VENIPUNCTURE: CPT

## 2024-03-01 PROCEDURE — 1160F RVW MEDS BY RX/DR IN RCRD: CPT | Mod: CPTII,S$GLB,,

## 2024-03-01 PROCEDURE — 3074F SYST BP LT 130 MM HG: CPT | Mod: CPTII,S$GLB,,

## 2024-03-01 PROCEDURE — 99999 PR PBB SHADOW E&M-EST. PATIENT-LVL III: CPT | Mod: PBBFAC,,,

## 2024-03-01 PROCEDURE — 1159F MED LIST DOCD IN RCRD: CPT | Mod: CPTII,S$GLB,,

## 2024-03-01 PROCEDURE — 3078F DIAST BP <80 MM HG: CPT | Mod: CPTII,S$GLB,,

## 2024-03-01 RX ORDER — SODIUM CHLORIDE 0.9 % (FLUSH) 0.9 %
10 SYRINGE (ML) INJECTION
Status: CANCELLED | OUTPATIENT
Start: 2024-03-21

## 2024-03-01 RX ORDER — SODIUM CHLORIDE 0.9 % (FLUSH) 0.9 %
10 SYRINGE (ML) INJECTION
Status: CANCELLED | OUTPATIENT
Start: 2024-03-01

## 2024-03-01 RX ORDER — HEPARIN 100 UNIT/ML
500 SYRINGE INTRAVENOUS
Status: CANCELLED | OUTPATIENT
Start: 2024-03-01

## 2024-03-01 RX ORDER — HEPARIN 100 UNIT/ML
500 SYRINGE INTRAVENOUS
Status: CANCELLED | OUTPATIENT
Start: 2024-03-21

## 2024-03-01 NOTE — PROGRESS NOTES
Subjective:      Patient ID: Main Aguilar is a 28 y.o. female.    Chief Complaint: Follow-up (MARVIN )      HPI:   is a pleasant 28-year-old female with history of PCOS, and prediabetes who presents today with her  for follow-up of iron deficiency anemia. Today, she notes increasing fatigue and numbness and tinging in hands.  She denies gross bleeding.  She describes her menstrual periods as regular, lasting 6-7 days; she uses 2-3 tampons per day; she has had some mild intermenstrual bleeding last year.  Per review of the medical record, her last Pap smear was September 2022 at an outside facility. She was told to have another one in 1 year.  Encouraged her to follow-up with OBGYN. Previously with some RUQ abdominal pain and bilateral groin pain she has been referred by primary to complete colonoscopy which may also r/o or identify other contributors to iron deficiency anemia. Pt notes she has taken oral iron supplementation in the past but causes GI upset with Metformin so iron was dc'd.         Social History     Socioeconomic History    Marital status:    Tobacco Use    Smoking status: Never     Passive exposure: Never    Smokeless tobacco: Never   Substance and Sexual Activity    Alcohol use: Yes     Alcohol/week: 1.0 standard drink of alcohol     Types: 1 Drinks containing 0.5 oz of alcohol per week     Comment: Maybe once every 3 - 4 months    Drug use: Not Currently     Types: Marijuana     Comment: Tried it over 4 years ago    Sexual activity: Yes     Partners: Male     Birth control/protection: None     Social Determinants of Health     Financial Resource Strain: Medium Risk (2/26/2024)    Overall Financial Resource Strain (CARDIA)     Difficulty of Paying Living Expenses: Somewhat hard   Food Insecurity: Food Insecurity Present (2/26/2024)    Hunger Vital Sign     Worried About Running Out of Food in the Last Year: Sometimes true     Ran Out of Food in the Last Year: Often true    Transportation Needs: Unmet Transportation Needs (2/26/2024)    PRAPARE - Transportation     Lack of Transportation (Medical): Yes     Lack of Transportation (Non-Medical): Yes   Physical Activity: Sufficiently Active (2/26/2024)    Exercise Vital Sign     Days of Exercise per Week: 4 days     Minutes of Exercise per Session: 40 min   Stress: Stress Concern Present (2/26/2024)    [employer] - Occupational Stress Questionnaire     Feeling of Stress : To some extent   Social Connections: Unknown (2/26/2024)    Social Connection and Isolation Panel [NHANES]     Frequency of Communication with Friends and Family: Once a week     Frequency of Social Gatherings with Friends and Family: Once a week     Active Member of Clubs or Organizations: No     Attends Club or Organization Meetings: Never     Marital Status:    Housing Stability: High Risk (2/26/2024)    Housing Stability Vital Sign     Unable to Pay for Housing in the Last Year: Yes     Number of Places Lived in the Last Year: 1     Unstable Housing in the Last Year: No       Family History   Problem Relation Age of Onset    Cancer Mother         Leukemia    Miscarriages / Stillbirths Mother     Hypertension Father     Cancer Maternal Grandmother         Colon    Cancer Paternal Grandmother         Breast       Past Surgical History:   Procedure Laterality Date    EXCISION OF GANGLION OF WRIST Right 9/11/2023    Procedure: EXCISION, GANGLION CYST, WRIST;  Surgeon: Vinay Camarena MD;  Location: [facility];  Service: Orthopedics;  Laterality: Right;  excision ganglion cyst dorsal right wrist       Past Medical History:   Diagnosis Date    Asthma     Nontoxic single thyroid nodule     PCOS (polycystic ovarian syndrome)     Prediabetes     Seborrheic dermatitis        Review of Systems   Constitutional:  Positive for activity change and fatigue. Negative for appetite change, chills and fever.   HENT:  Negative for congestion,  facial swelling, nosebleeds, rhinorrhea, sore throat and trouble swallowing.    Eyes:  Negative for photophobia, pain and visual disturbance.   Respiratory:  Negative for cough, shortness of breath and wheezing.    Cardiovascular:  Negative for chest pain, palpitations and leg swelling.   Gastrointestinal:  Negative for abdominal distention, abdominal pain, anal bleeding, blood in stool, constipation, diarrhea, nausea, rectal pain and vomiting.   Genitourinary:  Negative for difficulty urinating, dysuria, hematuria and vaginal bleeding.   Musculoskeletal:  Negative for arthralgias.   Skin:  Negative for color change, pallor, rash and wound.   Neurological:  Negative for dizziness, light-headedness, numbness and headaches.   Hematological:  Negative for adenopathy. Does not bruise/bleed easily.   Psychiatric/Behavioral:  The patient is not nervous/anxious.        Medication List with Changes/Refills   Current Medications    FLUOCINOLONE (DERMA-SMOOTHE) 0.01 % EXTERNAL OIL    Apply oil to scalp once to twice a day as needed for dryness    IRON-VITAMIN C 100-250 MG, ICAR-C, (ICAR-C) 100-250 MG TAB    Take 1 tablet by mouth Daily.    KETOCONAZOLE (NIZORAL) 2 % CREAM    AAA bid    KETOCONAZOLE (NIZORAL) 2 % SHAMPOO    Wash hair with medicated shampoo at least 1x/week - let sit on scalp at least 5 minutes prior to rinsing    METFORMIN (GLUCOPHAGE) 500 MG TABLET    Take 500 mg by mouth daily with breakfast.    TRIAMCINOLONE ACETONIDE 0.025% (KENALOG) 0.025 % CREAM    Apply topically 2 (two) times daily as needed. Mild steroid. Use sparingly for 1-2 weeks if needed then stop.        Objective:     Vitals:    03/01/24 0939   BP: 109/61   Pulse: 84   Temp: 97.1 °F (36.2 °C)       Physical Exam  Constitutional:       Appearance: Normal appearance.   Eyes:      Conjunctiva/sclera: Conjunctivae normal.   Cardiovascular:      Rate and Rhythm: Normal rate.   Pulmonary:      Effort: Pulmonary effort is normal.   Neurological:       Mental Status: She is alert.   Psychiatric:         Mood and Affect: Mood normal.         Behavior: Behavior normal.         Lab Results   Component Value Date    WBC 5.21 03/01/2024    HGB 8.7 (L) 03/01/2024    HCT 29.1 (L) 03/01/2024    MCV 73 (L) 03/01/2024     (H) 03/01/2024       Lab Results   Component Value Date     11/08/2023    K 4.1 11/08/2023     11/08/2023    CO2 23 11/08/2023    BUN 9 11/08/2023    CREATININE 0.7 11/08/2023    CALCIUM 9.4 11/08/2023    ANIONGAP 9 11/08/2023     Lab Results   Component Value Date    ALT 12 11/08/2023    AST 21 11/08/2023    ALKPHOS 75 11/08/2023    BILITOT 0.4 11/08/2023       Assessment/Plan:     Problem List Items Addressed This Visit          Oncology    Iron deficiency anemia - Primary     Lab Results   Component Value Date    HGB 8.7 (L) 03/01/2024     Lab Results   Component Value Date    IRON 19 (L) 02/26/2024    IRON 19 (L) 02/26/2024    TRANSFERRIN 352 02/26/2024    TIBC 521 (H) 02/26/2024    FESATURATED 4 (L) 02/26/2024      Lab Results   Component Value Date    FERRITIN 3 (L) 02/26/2024   Pt with severe anemia and iron deficiency   She has tried oral iron in the past but caused GI upset  Plan for Feraheme x 2 doses ASAP  Reviewed risks and benefits of IV iron therapy including possible allergy anaphylaxis, electrolyte abnormality, fatigue, and headache. Benefits likely outweigh the risks    Close f/u 6 weeks post completion of IV iron therapy with labs a couple of days prior                Relevant Orders    CBC Auto Differential (Completed)    CBC Auto Differential    Comprehensive Metabolic Panel    Ferritin    Iron and TIBC    Pregnancy, urine rapid           Med Onc Chart Routing      Follow up with physician    Follow up with [facility] 6 weeks. post completion IV iron therapy with labs 2 days prior   Infusion scheduling note New or changed treatment   Feraheme x 2 doses 1st available either location in AM if possible;stat upt prior    Injection scheduling note    Labs CBC, ferritin, CMP, iron and TIBC and other   Scheduling:  Preferred lab:  Lab interval:     Imaging    Pharmacy appointment    Other referrals                     NOA PatelP-C  Hematology/Oncology

## 2024-03-01 NOTE — ASSESSMENT & PLAN NOTE
Lab Results   Component Value Date    HGB 8.7 (L) 03/01/2024     Lab Results   Component Value Date    IRON 19 (L) 02/26/2024    IRON 19 (L) 02/26/2024    TRANSFERRIN 352 02/26/2024    TIBC 521 (H) 02/26/2024    FESATURATED 4 (L) 02/26/2024      Lab Results   Component Value Date    FERRITIN 3 (L) 02/26/2024     Pt with severe anemia and iron deficiency   She has tried oral iron in the past but caused GI upset  Plan for Feraheme x 2 doses ASAP  Reviewed risks and benefits of IV iron therapy including possible allergy anaphylaxis, electrolyte abnormality, fatigue, and headache. Benefits likely outweigh the risks    Close f/u 6 weeks post completion of IV iron therapy with labs a couple of days prior

## 2024-03-06 ENCOUNTER — PATIENT MESSAGE (OUTPATIENT)
Dept: INFUSION THERAPY | Facility: HOSPITAL | Age: 29
End: 2024-03-06
Payer: COMMERCIAL

## 2024-03-20 ENCOUNTER — INFUSION (OUTPATIENT)
Dept: INFUSION THERAPY | Facility: HOSPITAL | Age: 29
End: 2024-03-20
Payer: COMMERCIAL

## 2024-03-20 VITALS
RESPIRATION RATE: 16 BRPM | HEART RATE: 77 BPM | DIASTOLIC BLOOD PRESSURE: 74 MMHG | SYSTOLIC BLOOD PRESSURE: 126 MMHG | OXYGEN SATURATION: 100 % | TEMPERATURE: 99 F

## 2024-03-20 DIAGNOSIS — D50.9 IRON DEFICIENCY ANEMIA, UNSPECIFIED IRON DEFICIENCY ANEMIA TYPE: Primary | ICD-10-CM

## 2024-03-20 PROCEDURE — 25000003 PHARM REV CODE 250

## 2024-03-20 PROCEDURE — 63600175 PHARM REV CODE 636 W HCPCS: Mod: JZ,JG

## 2024-03-20 PROCEDURE — 96365 THER/PROPH/DIAG IV INF INIT: CPT

## 2024-03-20 RX ADMIN — FERUMOXYTOL 510 MG: 510 INJECTION INTRAVENOUS at 08:03

## 2024-03-20 NOTE — PLAN OF CARE
Patient tolerated Feraheme well today; no adverse reaction noted.  POC reviewed with pt.  NAD noted upon discharge.   Has f/u appt(s) scheduled per MD request.    Problem: Adult Inpatient Plan of Care  Goal: Plan of Care Review  Outcome: Ongoing, Progressing  Goal: Patient-Specific Goal (Individualized)  Outcome: Ongoing, Progressing  Goal: Absence of Hospital-Acquired Illness or Injury  Outcome: Ongoing, Progressing  Intervention: Identify and Manage Fall Risk  Flowsheets (Taken 3/20/2024 0844)  Safety Promotion/Fall Prevention: in recliner, wheels locked  Goal: Optimal Comfort and Wellbeing  Outcome: Ongoing, Progressing  Intervention: Provide Person-Centered Care  Flowsheets (Taken 3/20/2024 0844)  Trust Relationship/Rapport:   care explained   reassurance provided   choices provided   thoughts/feelings acknowledged   emotional support provided   questions answered   empathic listening provided   questions encouraged

## 2024-03-27 ENCOUNTER — INFUSION (OUTPATIENT)
Dept: INFUSION THERAPY | Facility: HOSPITAL | Age: 29
End: 2024-03-27
Payer: COMMERCIAL

## 2024-03-27 VITALS
DIASTOLIC BLOOD PRESSURE: 70 MMHG | TEMPERATURE: 98 F | HEART RATE: 84 BPM | OXYGEN SATURATION: 99 % | SYSTOLIC BLOOD PRESSURE: 113 MMHG | RESPIRATION RATE: 16 BRPM

## 2024-03-27 DIAGNOSIS — D50.9 IRON DEFICIENCY ANEMIA, UNSPECIFIED IRON DEFICIENCY ANEMIA TYPE: Primary | ICD-10-CM

## 2024-03-27 PROCEDURE — 25000003 PHARM REV CODE 250

## 2024-03-27 PROCEDURE — 63600175 PHARM REV CODE 636 W HCPCS: Mod: JZ,JG

## 2024-03-27 PROCEDURE — 96365 THER/PROPH/DIAG IV INF INIT: CPT

## 2024-03-27 RX ADMIN — FERUMOXYTOL 510 MG: 510 INJECTION INTRAVENOUS at 07:03

## 2024-03-27 NOTE — PLAN OF CARE
Patient tolerated Feraheme well today; no adverse reaction noted.  POC reviewed with pt.  NAD noted upon discharge.   Has f/u appt(s) scheduled per MD request.    Problem: Adult Inpatient Plan of Care  Goal: Plan of Care Review  Outcome: Ongoing, Progressing  Goal: Patient-Specific Goal (Individualized)  Outcome: Ongoing, Progressing  Goal: Absence of Hospital-Acquired Illness or Injury  Outcome: Ongoing, Progressing  Intervention: Identify and Manage Fall Risk  Flowsheets (Taken 3/27/2024 0752)  Safety Promotion/Fall Prevention: in recliner, wheels locked  Goal: Optimal Comfort and Wellbeing  Outcome: Ongoing, Progressing  Intervention: Provide Person-Centered Care  Flowsheets (Taken 3/27/2024 0752)  Trust Relationship/Rapport:   care explained   reassurance provided   choices provided   thoughts/feelings acknowledged   emotional support provided   empathic listening provided   questions answered   questions encouraged

## 2024-04-30 ENCOUNTER — OFFICE VISIT (OUTPATIENT)
Dept: INTERNAL MEDICINE | Facility: CLINIC | Age: 29
End: 2024-04-30
Payer: COMMERCIAL

## 2024-04-30 VITALS
HEIGHT: 61 IN | SYSTOLIC BLOOD PRESSURE: 116 MMHG | HEART RATE: 92 BPM | TEMPERATURE: 97 F | RESPIRATION RATE: 14 BRPM | OXYGEN SATURATION: 99 % | DIASTOLIC BLOOD PRESSURE: 74 MMHG | WEIGHT: 205.69 LBS | BODY MASS INDEX: 38.83 KG/M2

## 2024-04-30 DIAGNOSIS — N64.4 BREAST PAIN: ICD-10-CM

## 2024-04-30 DIAGNOSIS — E28.2 PCOS (POLYCYSTIC OVARIAN SYNDROME): Primary | Chronic | ICD-10-CM

## 2024-04-30 DIAGNOSIS — L21.9 SEBORRHEIC DERMATITIS: Chronic | ICD-10-CM

## 2024-04-30 DIAGNOSIS — R73.03 PRE-DIABETES: Chronic | ICD-10-CM

## 2024-04-30 DIAGNOSIS — E66.09 CLASS 2 OBESITY DUE TO EXCESS CALORIES WITHOUT SERIOUS COMORBIDITY WITH BODY MASS INDEX (BMI) OF 38.0 TO 38.9 IN ADULT: ICD-10-CM

## 2024-04-30 PROCEDURE — 3078F DIAST BP <80 MM HG: CPT | Mod: CPTII,S$GLB,, | Performed by: FAMILY MEDICINE

## 2024-04-30 PROCEDURE — 3074F SYST BP LT 130 MM HG: CPT | Mod: CPTII,S$GLB,, | Performed by: FAMILY MEDICINE

## 2024-04-30 PROCEDURE — 1159F MED LIST DOCD IN RCRD: CPT | Mod: CPTII,S$GLB,, | Performed by: FAMILY MEDICINE

## 2024-04-30 PROCEDURE — 99999 PR PBB SHADOW E&M-EST. PATIENT-LVL IV: CPT | Mod: PBBFAC,,, | Performed by: FAMILY MEDICINE

## 2024-04-30 PROCEDURE — 3008F BODY MASS INDEX DOCD: CPT | Mod: CPTII,S$GLB,, | Performed by: FAMILY MEDICINE

## 2024-04-30 PROCEDURE — 99214 OFFICE O/P EST MOD 30 MIN: CPT | Mod: S$GLB,,, | Performed by: FAMILY MEDICINE

## 2024-04-30 PROCEDURE — 1160F RVW MEDS BY RX/DR IN RCRD: CPT | Mod: CPTII,S$GLB,, | Performed by: FAMILY MEDICINE

## 2024-04-30 RX ORDER — FLUOCINOLONE ACETONIDE 0.11 MG/ML
OIL TOPICAL
Qty: 118 ML | Refills: 5 | Status: SHIPPED | OUTPATIENT
Start: 2024-04-30

## 2024-04-30 RX ORDER — TIRZEPATIDE 5 MG/.5ML
5 INJECTION, SOLUTION SUBCUTANEOUS
Qty: 4 PEN | Refills: 0 | Status: SHIPPED | OUTPATIENT
Start: 2024-04-30 | End: 2024-05-30

## 2024-04-30 RX ORDER — TIRZEPATIDE 2.5 MG/.5ML
2.5 INJECTION, SOLUTION SUBCUTANEOUS
COMMUNITY
Start: 2024-04-04 | End: 2024-05-30 | Stop reason: DRUGHIGH

## 2024-04-30 RX ORDER — KETOCONAZOLE 20 MG/ML
SHAMPOO, SUSPENSION TOPICAL
Qty: 120 ML | Refills: 5 | Status: SHIPPED | OUTPATIENT
Start: 2024-04-30

## 2024-04-30 NOTE — PROGRESS NOTES
Subjective     Patient ID: Main Aguilar is a 28 y.o. female.    Chief Complaint: Follow-up (8W F/U: Anemia + Wrist & Back Pain.)    History of Present Illness    Main presents today due to concerns about sleep deprivation and various body pains.    Main reports insufficient sleep, getting only four hours nightly due to the demands of working two jobs.    Main experiences ongoing discomfort in her wrist with no inflammation and satisfactory movement. Back pain has improved but still experiences intermittent stiffness and a persistent sensation of popping. Main has developed a fear of excessive movement due to potential risk of pulling a muscle.    She is experiencing pain in her breasts over the past 2-3 months, especially at the top and side. Discomfort sometimes necessitates massaging for relief and the frequency of this has been increasing. She denies finding any lumps or masses during self-exam.    She is currently taking Metformin and Mounjaro and has concerns about potential for hypoglycemia from this combination. She also recently refilled Ketoconazole shampoo.    Main expressed concerns about the mishandling of her payments for medical services, noticing a discrepancy of an outstanding balance of $200 despite keeping track of payments. Main also reports sometimes forgetting to eat due to a reduced appetite.    Main is scheduled for upcoming labs within the next week and will be following up with labs to assess the effectiveness of the recent two iron infusions.    ROS:  General: -fever, -chills, -fatigue, -weight gain, -weight loss, +loss of appetite  Eyes: -vision changes, -redness, -discharge  ENT: -ear pain, -nasal congestion, -sore throat  Cardiovascular: -chest pain, -palpitations, -lower extremity edema  Respiratory: -cough, -shortness of breath  Gastrointestinal: -abdominal pain, -nausea, -vomiting, -diarrhea, -constipation, -blood in  stool  Genitourinary: -dysuria, -hematuria, -frequency  Musculoskeletal: +joint pain, -muscle pain  Skin: -rash, -lesion  Neurological: -headache, -dizziness, -numbness, -tingling  Psychiatric: -anxiety, -depression, +sleep difficulty  Breasts: +breast pain            Objective     Physical Exam  Vitals and nursing note reviewed.   Constitutional:       Appearance: Normal appearance. She is normal weight.   HENT:      Head: Normocephalic and atraumatic.   Eyes:      Extraocular Movements: Extraocular movements intact.      Conjunctiva/sclera: Conjunctivae normal.   Cardiovascular:      Rate and Rhythm: Normal rate and regular rhythm.      Pulses: Normal pulses.      Heart sounds: Normal heart sounds.   Pulmonary:      Effort: Pulmonary effort is normal.      Breath sounds: Normal breath sounds.   Skin:     General: Skin is warm and dry.   Neurological:      General: No focal deficit present.      Mental Status: She is alert and oriented to person, place, and time.   Psychiatric:         Mood and Affect: Mood normal.         Behavior: Behavior normal.                     Assessment and Plan     1. PCOS (polycystic ovarian syndrome)  Comments:  stable, cont mounjaro  Orders:  -     tirzepatide (MOUNJARO) 5 mg/0.5 mL PnIj; Inject 5 mg into the skin every 7 days.  Dispense: 4 Pen; Refill: 0    2. Pre-diabetes  Comments:  stable, cont mounjaro  Orders:  -     tirzepatide (MOUNJARO) 5 mg/0.5 mL PnIj; Inject 5 mg into the skin every 7 days.  Dispense: 4 Pen; Refill: 0    3. Seborrheic dermatitis  Comments:  medication refilled  Orders:  -     fluocinolone (DERMA-SMOOTHE) 0.01 % external oil; Apply oil to scalp once to twice a day as needed for dryness  Dispense: 118 mL; Refill: 5  -     ketoconazole (NIZORAL) 2 % shampoo; Wash hair with medicated shampoo at least 1x/week - let sit on scalp at least 5 minutes prior to rinsing  Dispense: 120 mL; Refill: 5    4. Class 2 obesity due to excess calories without serious  comorbidity with body mass index (BMI) of 38.0 to 38.9 in adult    5. Breast pain  -     Mammo Digital Diagnostic Bilat with [facility]; Future; Expected date: 04/30/2024        Assessment & Plan    MUSCULOSKELETAL PAIN:  - Recommend the addition of Pilates to the patient's exercise routine to aid in stretching and elongating the muscles, potentially alleviating wrist and back pain.  - Educated the patient on the benefits of this exercise regimen.  WEIGHT MANAGEMENT:  - Increased the dosage of Mounjaro to 5 mg to support the patient's weight loss efforts.  - Instructed the patient to temporarily discontinue Metformin upon initiating the increased dosage of Mounjaro, due to potential hypoglycemic complications.  BREAST PAIN:  - Ordered a mammogram and potentially an ultrasound in response to the patient's reported breast pain.  - Discussed the potential need for these diagnostic procedures with the patient.  GASTROINTESTINAL HEALTH AND ANEMIA:  - Scheduled a colonoscopy and a lab check for the patient in the upcoming week.  - Discussed the patient's low hemoglobin and iron levels, emphasizing the importance of close monitoring.  GENERAL HEALTH MONITORING:  - Planned to continue with 3-month follow-ups until the patient's health stabilizes.  - Scheduled the patient's annual follow-up to occur once all urgent health issues are addressed.              Follow up in about 3 months (around 7/30/2024).    This note was generated with the assistance of ambient listening technology. Verbal consent was obtained by the patient and accompanying visitor(s) for the recording of patient appointment to facilitate this note. I attest to having reviewed and edited the generated note for accuracy, though some syntax or spelling errors may persist. Please contact the author of this note for any clarification.

## 2024-05-06 ENCOUNTER — LAB VISIT (OUTPATIENT)
Dept: LAB | Facility: HOSPITAL | Age: 29
End: 2024-05-06
Payer: COMMERCIAL

## 2024-05-06 DIAGNOSIS — D50.9 IRON DEFICIENCY ANEMIA, UNSPECIFIED IRON DEFICIENCY ANEMIA TYPE: ICD-10-CM

## 2024-05-06 LAB
ALBUMIN SERPL BCP-MCNC: 4.1 G/DL (ref 3.5–5.2)
ALP SERPL-CCNC: 69 U/L (ref 55–135)
ALT SERPL W/O P-5'-P-CCNC: 14 U/L (ref 10–44)
ANION GAP SERPL CALC-SCNC: 7 MMOL/L (ref 8–16)
AST SERPL-CCNC: 17 U/L (ref 10–40)
BASOPHILS # BLD AUTO: 0.03 K/UL (ref 0–0.2)
BASOPHILS NFR BLD: 0.6 % (ref 0–1.9)
BILIRUB SERPL-MCNC: 0.6 MG/DL (ref 0.1–1)
BUN SERPL-MCNC: 10 MG/DL (ref 6–20)
CALCIUM SERPL-MCNC: 9.8 MG/DL (ref 8.7–10.5)
CHLORIDE SERPL-SCNC: 109 MMOL/L (ref 95–110)
CO2 SERPL-SCNC: 23 MMOL/L (ref 23–29)
CREAT SERPL-MCNC: 0.8 MG/DL (ref 0.5–1.4)
DIFFERENTIAL METHOD BLD: ABNORMAL
EOSINOPHIL # BLD AUTO: 0.1 K/UL (ref 0–0.5)
EOSINOPHIL NFR BLD: 2.6 % (ref 0–8)
ERYTHROCYTE [DISTWIDTH] IN BLOOD BY AUTOMATED COUNT: 21.1 % (ref 11.5–14.5)
EST. GFR  (NO RACE VARIABLE): >60 ML/MIN/1.73 M^2
FERRITIN SERPL-MCNC: 206 NG/ML (ref 20–300)
GLUCOSE SERPL-MCNC: 81 MG/DL (ref 70–110)
HCT VFR BLD AUTO: 36.9 % (ref 37–48.5)
HGB BLD-MCNC: 11.7 G/DL (ref 12–16)
IMM GRANULOCYTES # BLD AUTO: 0.01 K/UL (ref 0–0.04)
IMM GRANULOCYTES NFR BLD AUTO: 0.2 % (ref 0–0.5)
IRON SERPL-MCNC: 101 UG/DL (ref 30–160)
LYMPHOCYTES # BLD AUTO: 2.3 K/UL (ref 1–4.8)
LYMPHOCYTES NFR BLD: 45.8 % (ref 18–48)
MCH RBC QN AUTO: 24.8 PG (ref 27–31)
MCHC RBC AUTO-ENTMCNC: 31.7 G/DL (ref 32–36)
MCV RBC AUTO: 78 FL (ref 82–98)
MONOCYTES # BLD AUTO: 0.4 K/UL (ref 0.3–1)
MONOCYTES NFR BLD: 8.1 % (ref 4–15)
NEUTROPHILS # BLD AUTO: 2.2 K/UL (ref 1.8–7.7)
NEUTROPHILS NFR BLD: 42.7 % (ref 38–73)
NRBC BLD-RTO: 0 /100 WBC
PLATELET # BLD AUTO: 427 K/UL (ref 150–450)
PMV BLD AUTO: 9.2 FL (ref 9.2–12.9)
POTASSIUM SERPL-SCNC: 4.2 MMOL/L (ref 3.5–5.1)
PROT SERPL-MCNC: 7.8 G/DL (ref 6–8.4)
RBC # BLD AUTO: 4.71 M/UL (ref 4–5.4)
SATURATED IRON: 31 % (ref 20–50)
SODIUM SERPL-SCNC: 139 MMOL/L (ref 136–145)
TOTAL IRON BINDING CAPACITY: 323 UG/DL (ref 250–450)
TRANSFERRIN SERPL-MCNC: 218 MG/DL (ref 200–375)
WBC # BLD AUTO: 5.09 K/UL (ref 3.9–12.7)

## 2024-05-06 PROCEDURE — 82728 ASSAY OF FERRITIN: CPT

## 2024-05-06 PROCEDURE — 80053 COMPREHEN METABOLIC PANEL: CPT

## 2024-05-06 PROCEDURE — 36415 COLL VENOUS BLD VENIPUNCTURE: CPT

## 2024-05-06 PROCEDURE — 83540 ASSAY OF IRON: CPT

## 2024-05-06 PROCEDURE — 85025 COMPLETE CBC W/AUTO DIFF WBC: CPT

## 2024-05-08 ENCOUNTER — OFFICE VISIT (OUTPATIENT)
Dept: HEMATOLOGY/ONCOLOGY | Facility: CLINIC | Age: 29
End: 2024-05-08
Payer: COMMERCIAL

## 2024-05-08 DIAGNOSIS — D50.9 IRON DEFICIENCY ANEMIA, UNSPECIFIED IRON DEFICIENCY ANEMIA TYPE: Primary | ICD-10-CM

## 2024-05-08 PROCEDURE — 1159F MED LIST DOCD IN RCRD: CPT | Mod: CPTII,95,,

## 2024-05-08 PROCEDURE — 1160F RVW MEDS BY RX/DR IN RCRD: CPT | Mod: CPTII,95,,

## 2024-05-08 PROCEDURE — 99214 OFFICE O/P EST MOD 30 MIN: CPT | Mod: 95,,,

## 2024-05-14 NOTE — PROGRESS NOTES
Subjective:      Patient ID: Main Aguilar is a 28 y.o. female.    Chief Complaint: Follow-up (MARVIN)    The patient location is: [place]  The chief complaint leading to consultation is: follow-up    Visit type: audiovisual    Face to Face time with patient: 15   30 minutes of total time spent on the encounter, which includes face to face time and non-face to face time preparing to see the patient (eg, review of tests), Obtaining and/or reviewing separately obtained history, Documenting clinical information in the electronic or other health record, Independently interpreting results (not separately reported) and communicating results to the patient/family/caregiver, or Care coordination (not separately reported).         Each patient to whom he or she provides medical services by telemedicine is:  (1) informed of the relationship between the physician and patient and the respective role of any other health care provider with respect to management of the patient; and (2) notified that he or she may decline to receive medical services by telemedicine and may withdraw from such care at any time.    Notes:      HPI:   is a pleasant 28-year-old female with history of PCOS, and prediabetes who presents today with her  for follow-up of iron deficiency anemia. Today, she notes more energy than prior, however, regular heavy cycles continue. Per review of the medical record, her last Pap smear was September 2022 at an outside facility. She was told to have another one in 1 year.  Encouraged her to follow-up with OBGYN. Previously with some RUQ abdominal pain and bilateral groin pain she has been referred by primary to complete colonoscopy which may also r/o or identify other contributors to iron deficiency anemia. Pt notes she has taken oral iron supplementation in the past but causes GI upset with Metformin so iron was dc'd.         Social History     Socioeconomic History    Marital status:     Tobacco Use    Smoking status: Never     Passive exposure: Never    Smokeless tobacco: Never   Substance and Sexual Activity    Alcohol use: Yes     Alcohol/week: 1.0 standard drink of alcohol     Types: 1 Drinks containing 0.5 oz of alcohol per week     Comment: Maybe once every 3 - 4 months    Drug use: Not Currently     Types: Marijuana     Comment: Tried it over 4 years ago    Sexual activity: Yes     Partners: Male     Birth control/protection: None     Social Determinants of Health     Financial Resource Strain: Medium Risk (2/26/2024)    Overall Financial Resource Strain (CARDIA)     Difficulty of Paying Living Expenses: Somewhat hard   Food Insecurity: Food Insecurity Present (2/26/2024)    Hunger Vital Sign     Worried About Running Out of Food in the Last Year: Sometimes true     Ran Out of Food in the Last Year: Often true   Transportation Needs: Unmet Transportation Needs (2/26/2024)    PRAPARE - Transportation     Lack of Transportation (Medical): Yes     Lack of Transportation (Non-Medical): Yes   Physical Activity: Sufficiently Active (2/26/2024)    Exercise Vital Sign     Days of Exercise per Week: 4 days     Minutes of Exercise per Session: 40 min   Stress: Stress Concern Present (2/26/2024)    [employer] - Occupational Stress Questionnaire     Feeling of Stress : To some extent   Housing Stability: High Risk (2/26/2024)    Housing Stability Vital Sign     Unable to Pay for Housing in the Last Year: Yes     Number of Places Lived in the Last Year: 1     Unstable Housing in the Last Year: No       Family History   Problem Relation Name Age of Onset    Cancer Mother Kenyana         Leukemia    Miscarriages / Stillbirths Mother Kenyana     Hypertension Father      Cancer Maternal Grandmother Xi Carlson         Colon    Cancer Paternal Grandmother June Brown         Breast       Past Surgical History:   Procedure Laterality Date    EXCISION OF GANGLION OF WRIST Right  9/11/2023    Procedure: EXCISION, GANGLION CYST, WRIST;  Surgeon: Vinay Camarena MD;  Location: [facility] OR;  Service: Orthopedics;  Laterality: Right;  excision ganglion cyst dorsal right wrist       Past Medical History:   Diagnosis Date    Asthma     Nontoxic single thyroid nodule     PCOS (polycystic ovarian syndrome)     Prediabetes     Seborrheic dermatitis        Review of Systems   Constitutional:  Negative for activity change, appetite change, chills, fatigue and fever.   HENT:  Negative for congestion, facial swelling, nosebleeds, rhinorrhea, sore throat and trouble swallowing.    Eyes:  Negative for photophobia, pain and visual disturbance.   Respiratory:  Negative for cough, shortness of breath and wheezing.    Cardiovascular:  Negative for chest pain, palpitations and leg swelling.   Gastrointestinal:  Negative for abdominal distention, abdominal pain, anal bleeding, blood in stool, constipation, diarrhea, nausea, rectal pain and vomiting.   Genitourinary:  Negative for difficulty urinating, dysuria, hematuria and vaginal bleeding.   Musculoskeletal:  Negative for arthralgias.   Skin:  Negative for color change, pallor, rash and wound.   Neurological:  Negative for dizziness, light-headedness, numbness and headaches.   Hematological:  Negative for adenopathy. Does not bruise/bleed easily.   Psychiatric/Behavioral:  The patient is not nervous/anxious.        Medication List with Changes/Refills   Current Medications    FLUOCINOLONE (DERMA-SMOOTHE) 0.01 % EXTERNAL OIL    Apply oil to scalp once to twice a day as needed for dryness    IRON-VITAMIN C 100-250 MG, ICAR-C, (ICAR-C) 100-250 MG TAB    Take 1 tablet by mouth Daily.    KETOCONAZOLE (NIZORAL) 2 % CREAM    AAA bid    KETOCONAZOLE (NIZORAL) 2 % SHAMPOO    Wash hair with medicated shampoo at least 1x/week - let sit on scalp at least 5 minutes prior to rinsing    METFORMIN (GLUCOPHAGE) 500 MG TABLET    Take 500 mg by mouth daily with breakfast.     MOUNJARO 2.5 MG/0.5 ML PNIJ    Inject 2.5 mg into the skin every 7 days.    TIRZEPATIDE (MOUNJARO) 5 MG/0.5 ML PNIJ    Inject 5 mg into the skin every 7 days.    TRIAMCINOLONE ACETONIDE 0.025% (KENALOG) 0.025 % CREAM    Apply topically 2 (two) times daily as needed. Mild steroid. Use sparingly for 1-2 weeks if needed then stop.        Objective:     There were no vitals filed for this visit.      Physical Exam  Constitutional:       Appearance: Normal appearance.   Eyes:      Conjunctiva/sclera: Conjunctivae normal.   Cardiovascular:      Rate and Rhythm: Normal rate.   Pulmonary:      Effort: Pulmonary effort is normal.   Neurological:      Mental Status: She is alert.   Psychiatric:         Mood and Affect: Mood normal.         Behavior: Behavior normal.         Lab Results   Component Value Date    WBC 5.09 05/06/2024    HGB 11.7 (L) 05/06/2024    HCT 36.9 (L) 05/06/2024    MCV 78 (L) 05/06/2024     05/06/2024       Lab Results   Component Value Date     05/06/2024    K 4.2 05/06/2024     05/06/2024    CO2 23 05/06/2024    BUN 10 05/06/2024    CREATININE 0.8 05/06/2024    CALCIUM 9.8 05/06/2024    ANIONGAP 7 (L) 05/06/2024     Lab Results   Component Value Date    ALT 14 05/06/2024    AST 17 05/06/2024    ALKPHOS 69 05/06/2024    BILITOT 0.6 05/06/2024       Assessment/Plan:     Problem List Items Addressed This Visit          Oncology    Iron deficiency anemia - Primary     Lab Results   Component Value Date    HGB 11.7 (L) 05/06/2024   Notable improvement  from prior   Lab Results   Component Value Date    IRON 101 05/06/2024    TRANSFERRIN 218 05/06/2024    TIBC 323 05/06/2024    FESATURATED 31 05/06/2024      Lab Results   Component Value Date    FERRITIN 206 05/06/2024     Hgb and iron indices with notable improvement from prior  S/p Feraheme x 2 doses 03/27/24  Ok to restart oral iron supplementation daily      f/u 3 months with repeat labs a few days prior VV ok          Relevant Orders     CBC Auto Differential    Comprehensive Metabolic Panel    Ferritin    Iron and TIBC             Med Onc Chart Routing      Follow up with physician    Follow up with CRYSTAL 3 months. with repeat labs prior VV ok   Infusion scheduling note    Injection scheduling note    Labs CBC, CMP, ferritin and iron and TIBC   Scheduling:  Preferred lab:  Lab interval:     Imaging    Pharmacy appointment    Other referrals                     PADMINI Negro, FNP-C  Hematology/Oncology

## 2024-05-14 NOTE — ASSESSMENT & PLAN NOTE
Lab Results   Component Value Date    HGB 11.7 (L) 05/06/2024   Notable improvement  from prior   Lab Results   Component Value Date    IRON 101 05/06/2024    TRANSFERRIN 218 05/06/2024    TIBC 323 05/06/2024    FESATURATED 31 05/06/2024      Lab Results   Component Value Date    FERRITIN 206 05/06/2024     Hgb and iron indices with notable improvement from prior  S/p Feraheme x 2 doses 03/27/24  Ok to restart oral iron supplementation daily      f/u 3 months with repeat labs a few days prior VV ok

## 2024-05-29 DIAGNOSIS — R73.03 PRE-DIABETES: Chronic | ICD-10-CM

## 2024-05-29 DIAGNOSIS — E28.2 PCOS (POLYCYSTIC OVARIAN SYNDROME): Chronic | ICD-10-CM

## 2024-05-30 RX ORDER — TIRZEPATIDE 5 MG/.5ML
5 INJECTION, SOLUTION SUBCUTANEOUS
Qty: 4 PEN | Refills: 3 | Status: SHIPPED | OUTPATIENT
Start: 2024-05-30

## 2024-05-30 NOTE — TELEPHONE ENCOUNTER
Care Due:                  Date            Visit Type   Department     Provider  --------------------------------------------------------------------------------                                EP -                              PRIMARY      [facility]  Sandra  Last Visit: 04-      CARE ([facility])   MEDICINE       Carlson-Pedescleaux                              EP -                              PRIMARY      [facility]  Sandra  Next Visit: 08-      CARE ([facility])   MEDICINE       Carlson-Pedescleaux                                                            Last  Test          Frequency    Reason                     Performed    Due Date  --------------------------------------------------------------------------------    HBA1C.......  6 months...  tirzepatide..............  11- 05-    [facility] Embedded Care Due Messages. Reference number: [id number].   5/29/2024 7:05:29 PM CDT

## 2024-05-30 NOTE — TELEPHONE ENCOUNTER
Refill Routing Note   Medication(s) are not appropriate for processing by [facility] for the following reason(s):        Required labs outdated  New or recently adjusted medication    [facility] action(s):  Defer     Requires labs : Yes             Appointments  past 12m or future 3m with PCP    Date Provider   Last Visit   4/30/2024 Sandra Huang MD   Next Visit   8/6/2024 Sandra Huang MD   ED visits in past 90 days: 0        Note composed:7:57 AM 05/30/2024

## 2024-07-03 ENCOUNTER — PATIENT MESSAGE (OUTPATIENT)
Dept: INTERNAL MEDICINE | Facility: CLINIC | Age: 29
End: 2024-07-03
Payer: COMMERCIAL

## 2024-08-02 ENCOUNTER — TELEPHONE (OUTPATIENT)
Dept: INTERNAL MEDICINE | Facility: CLINIC | Age: 29
End: 2024-08-02
Payer: COMMERCIAL

## 2024-08-02 DIAGNOSIS — R73.03 PRE-DIABETES: Chronic | ICD-10-CM

## 2024-08-02 DIAGNOSIS — E28.2 PCOS (POLYCYSTIC OVARIAN SYNDROME): Chronic | ICD-10-CM

## 2024-08-02 RX ORDER — TIRZEPATIDE 5 MG/.5ML
5 INJECTION, SOLUTION SUBCUTANEOUS
Qty: 4 PEN | Refills: 3 | Status: SHIPPED | OUTPATIENT
Start: 2024-08-02

## 2024-08-02 NOTE — TELEPHONE ENCOUNTER
Patient is requesting a refill for Mounjaro before insurance change.    L/v: 4/30/24  F/u: 8/6/24  A1c: 11/8/24 Normal  Rx: [id number]

## 2024-08-02 NOTE — TELEPHONE ENCOUNTER
----- Message from Olaf Russell sent at 8/2/2024 12:32 PM CDT -----  .Type:  Needs Medical Advice    Who Called: pt    Would the patient rather a call back or a response via [facility]? Call back  Best Call Back Number: [phone]  Additional Information:     Pt stated she would like a call back about getting a refill on her tirzepatide (MOUNJARO) 5 mg/0.5 mL PnIj because she is in the process of changing insurance  and didn't want any gaps between her medication

## 2024-08-02 NOTE — TELEPHONE ENCOUNTER
Attempted to contact the patient regarding prescription update; Per the patient's request, Dr. Carlson sent the refill for Mounjaro today to [facility].

## 2024-08-09 ENCOUNTER — OFFICE VISIT (OUTPATIENT)
Dept: INTERNAL MEDICINE | Facility: CLINIC | Age: 29
End: 2024-08-09
Payer: COMMERCIAL

## 2024-08-09 ENCOUNTER — LAB VISIT (OUTPATIENT)
Dept: LAB | Facility: HOSPITAL | Age: 29
End: 2024-08-09
Payer: COMMERCIAL

## 2024-08-09 VITALS
BODY MASS INDEX: 34.55 KG/M2 | HEIGHT: 61 IN | WEIGHT: 183 LBS | OXYGEN SATURATION: 99 % | SYSTOLIC BLOOD PRESSURE: 110 MMHG | TEMPERATURE: 97 F | HEART RATE: 99 BPM | DIASTOLIC BLOOD PRESSURE: 62 MMHG

## 2024-08-09 DIAGNOSIS — M54.89 INTERSCAPULAR PAIN: ICD-10-CM

## 2024-08-09 DIAGNOSIS — R73.03 PRE-DIABETES: ICD-10-CM

## 2024-08-09 DIAGNOSIS — E28.2 POLYCYSTIC OVARIES: ICD-10-CM

## 2024-08-09 DIAGNOSIS — E66.09 CLASS 1 OBESITY DUE TO EXCESS CALORIES WITHOUT SERIOUS COMORBIDITY WITH BODY MASS INDEX (BMI) OF 34.0 TO 34.9 IN ADULT: ICD-10-CM

## 2024-08-09 DIAGNOSIS — R73.03 PRE-DIABETES: Primary | Chronic | ICD-10-CM

## 2024-08-09 DIAGNOSIS — D50.9 IRON DEFICIENCY ANEMIA, UNSPECIFIED IRON DEFICIENCY ANEMIA TYPE: ICD-10-CM

## 2024-08-09 DIAGNOSIS — D50.9 IRON DEFICIENCY ANEMIA, UNSPECIFIED IRON DEFICIENCY ANEMIA TYPE: Chronic | ICD-10-CM

## 2024-08-09 LAB
ALBUMIN SERPL BCP-MCNC: 4.1 G/DL (ref 3.5–5.2)
ALBUMIN SERPL BCP-MCNC: 4.1 G/DL (ref 3.5–5.2)
ALP SERPL-CCNC: 67 U/L (ref 55–135)
ALP SERPL-CCNC: 67 U/L (ref 55–135)
ALT SERPL W/O P-5'-P-CCNC: 15 U/L (ref 10–44)
ALT SERPL W/O P-5'-P-CCNC: 15 U/L (ref 10–44)
ANION GAP SERPL CALC-SCNC: 11 MMOL/L (ref 8–16)
ANION GAP SERPL CALC-SCNC: 11 MMOL/L (ref 8–16)
AST SERPL-CCNC: 22 U/L (ref 10–40)
AST SERPL-CCNC: 22 U/L (ref 10–40)
BASOPHILS # BLD AUTO: 0.04 K/UL (ref 0–0.2)
BASOPHILS # BLD AUTO: 0.04 K/UL (ref 0–0.2)
BASOPHILS NFR BLD: 0.6 % (ref 0–1.9)
BASOPHILS NFR BLD: 0.6 % (ref 0–1.9)
BILIRUB SERPL-MCNC: 1 MG/DL (ref 0.1–1)
BILIRUB SERPL-MCNC: 1 MG/DL (ref 0.1–1)
BUN SERPL-MCNC: 9 MG/DL (ref 6–20)
BUN SERPL-MCNC: 9 MG/DL (ref 6–20)
CALCIUM SERPL-MCNC: 9.2 MG/DL (ref 8.7–10.5)
CALCIUM SERPL-MCNC: 9.2 MG/DL (ref 8.7–10.5)
CHLORIDE SERPL-SCNC: 107 MMOL/L (ref 95–110)
CHLORIDE SERPL-SCNC: 107 MMOL/L (ref 95–110)
CO2 SERPL-SCNC: 22 MMOL/L (ref 23–29)
CO2 SERPL-SCNC: 22 MMOL/L (ref 23–29)
CREAT SERPL-MCNC: 0.7 MG/DL (ref 0.5–1.4)
CREAT SERPL-MCNC: 0.7 MG/DL (ref 0.5–1.4)
DIFFERENTIAL METHOD BLD: ABNORMAL
DIFFERENTIAL METHOD BLD: ABNORMAL
EOSINOPHIL # BLD AUTO: 0.1 K/UL (ref 0–0.5)
EOSINOPHIL # BLD AUTO: 0.1 K/UL (ref 0–0.5)
EOSINOPHIL NFR BLD: 1.5 % (ref 0–8)
EOSINOPHIL NFR BLD: 1.5 % (ref 0–8)
ERYTHROCYTE [DISTWIDTH] IN BLOOD BY AUTOMATED COUNT: 13.2 % (ref 11.5–14.5)
ERYTHROCYTE [DISTWIDTH] IN BLOOD BY AUTOMATED COUNT: 13.2 % (ref 11.5–14.5)
EST. GFR  (NO RACE VARIABLE): >60 ML/MIN/1.73 M^2
EST. GFR  (NO RACE VARIABLE): >60 ML/MIN/1.73 M^2
ESTIMATED AVG GLUCOSE: 91 MG/DL (ref 68–131)
FERRITIN SERPL-MCNC: 182 NG/ML (ref 20–300)
FERRITIN SERPL-MCNC: 182 NG/ML (ref 20–300)
GLUCOSE SERPL-MCNC: 72 MG/DL (ref 70–110)
GLUCOSE SERPL-MCNC: 72 MG/DL (ref 70–110)
HBA1C MFR BLD: 4.8 % (ref 4–5.6)
HCT VFR BLD AUTO: 36.6 % (ref 37–48.5)
HCT VFR BLD AUTO: 36.6 % (ref 37–48.5)
HGB BLD-MCNC: 11.2 G/DL (ref 12–16)
HGB BLD-MCNC: 11.2 G/DL (ref 12–16)
IMM GRANULOCYTES # BLD AUTO: 0.01 K/UL (ref 0–0.04)
IMM GRANULOCYTES # BLD AUTO: 0.01 K/UL (ref 0–0.04)
IMM GRANULOCYTES NFR BLD AUTO: 0.1 % (ref 0–0.5)
IMM GRANULOCYTES NFR BLD AUTO: 0.1 % (ref 0–0.5)
IRON SERPL-MCNC: 52 UG/DL (ref 30–160)
IRON SERPL-MCNC: 52 UG/DL (ref 30–160)
LYMPHOCYTES # BLD AUTO: 3.4 K/UL (ref 1–4.8)
LYMPHOCYTES # BLD AUTO: 3.4 K/UL (ref 1–4.8)
LYMPHOCYTES NFR BLD: 46.7 % (ref 18–48)
LYMPHOCYTES NFR BLD: 46.7 % (ref 18–48)
MCH RBC QN AUTO: 27.2 PG (ref 27–31)
MCH RBC QN AUTO: 27.2 PG (ref 27–31)
MCHC RBC AUTO-ENTMCNC: 30.6 G/DL (ref 32–36)
MCHC RBC AUTO-ENTMCNC: 30.6 G/DL (ref 32–36)
MCV RBC AUTO: 89 FL (ref 82–98)
MCV RBC AUTO: 89 FL (ref 82–98)
MONOCYTES # BLD AUTO: 0.6 K/UL (ref 0.3–1)
MONOCYTES # BLD AUTO: 0.6 K/UL (ref 0.3–1)
MONOCYTES NFR BLD: 7.9 % (ref 4–15)
MONOCYTES NFR BLD: 7.9 % (ref 4–15)
NEUTROPHILS # BLD AUTO: 3.1 K/UL (ref 1.8–7.7)
NEUTROPHILS # BLD AUTO: 3.1 K/UL (ref 1.8–7.7)
NEUTROPHILS NFR BLD: 43.2 % (ref 38–73)
NEUTROPHILS NFR BLD: 43.2 % (ref 38–73)
NRBC BLD-RTO: 0 /100 WBC
NRBC BLD-RTO: 0 /100 WBC
PLATELET # BLD AUTO: 471 K/UL (ref 150–450)
PLATELET # BLD AUTO: 471 K/UL (ref 150–450)
PMV BLD AUTO: 9.8 FL (ref 9.2–12.9)
PMV BLD AUTO: 9.8 FL (ref 9.2–12.9)
POTASSIUM SERPL-SCNC: 3.7 MMOL/L (ref 3.5–5.1)
POTASSIUM SERPL-SCNC: 3.7 MMOL/L (ref 3.5–5.1)
PROT SERPL-MCNC: 7.4 G/DL (ref 6–8.4)
PROT SERPL-MCNC: 7.4 G/DL (ref 6–8.4)
RBC # BLD AUTO: 4.12 M/UL (ref 4–5.4)
RBC # BLD AUTO: 4.12 M/UL (ref 4–5.4)
SATURATED IRON: 18 % (ref 20–50)
SATURATED IRON: 18 % (ref 20–50)
SODIUM SERPL-SCNC: 140 MMOL/L (ref 136–145)
SODIUM SERPL-SCNC: 140 MMOL/L (ref 136–145)
TOTAL IRON BINDING CAPACITY: 293 UG/DL (ref 250–450)
TOTAL IRON BINDING CAPACITY: 293 UG/DL (ref 250–450)
TRANSFERRIN SERPL-MCNC: 198 MG/DL (ref 200–375)
TRANSFERRIN SERPL-MCNC: 198 MG/DL (ref 200–375)
WBC # BLD AUTO: 7.18 K/UL (ref 3.9–12.7)
WBC # BLD AUTO: 7.18 K/UL (ref 3.9–12.7)

## 2024-08-09 PROCEDURE — 99999 PR PBB SHADOW E&M-EST. PATIENT-LVL IV: CPT | Mod: PBBFAC,,, | Performed by: FAMILY MEDICINE

## 2024-08-09 PROCEDURE — 3078F DIAST BP <80 MM HG: CPT | Mod: CPTII,S$GLB,, | Performed by: FAMILY MEDICINE

## 2024-08-09 PROCEDURE — 3008F BODY MASS INDEX DOCD: CPT | Mod: CPTII,S$GLB,, | Performed by: FAMILY MEDICINE

## 2024-08-09 PROCEDURE — 3074F SYST BP LT 130 MM HG: CPT | Mod: CPTII,S$GLB,, | Performed by: FAMILY MEDICINE

## 2024-08-09 PROCEDURE — 82728 ASSAY OF FERRITIN: CPT

## 2024-08-09 PROCEDURE — 36415 COLL VENOUS BLD VENIPUNCTURE: CPT | Mod: PO

## 2024-08-09 PROCEDURE — 83036 HEMOGLOBIN GLYCOSYLATED A1C: CPT | Performed by: FAMILY MEDICINE

## 2024-08-09 PROCEDURE — 84466 ASSAY OF TRANSFERRIN: CPT

## 2024-08-09 PROCEDURE — 99214 OFFICE O/P EST MOD 30 MIN: CPT | Mod: S$GLB,,, | Performed by: FAMILY MEDICINE

## 2024-08-09 PROCEDURE — 3044F HG A1C LEVEL LT 7.0%: CPT | Mod: CPTII,S$GLB,, | Performed by: FAMILY MEDICINE

## 2024-08-09 PROCEDURE — 1159F MED LIST DOCD IN RCRD: CPT | Mod: CPTII,S$GLB,, | Performed by: FAMILY MEDICINE

## 2024-08-09 PROCEDURE — 80053 COMPREHEN METABOLIC PANEL: CPT

## 2024-08-09 PROCEDURE — 85025 COMPLETE CBC W/AUTO DIFF WBC: CPT

## 2024-08-09 PROCEDURE — 1160F RVW MEDS BY RX/DR IN RCRD: CPT | Mod: CPTII,S$GLB,, | Performed by: FAMILY MEDICINE

## 2024-08-09 PROCEDURE — 83540 ASSAY OF IRON: CPT

## 2024-08-09 NOTE — PATIENT INSTRUCTIONS
[employer], [facility] - [place], [place] - [address]. [address]  [address]  [place] [facility] [place] [address]  Phone: [phone] Fax: [phone]

## 2024-08-09 NOTE — PROGRESS NOTES
Subjective     Patient ID: Main Aguilar is a 28 y.o. female.    Chief Complaint: Follow-up, Back Pain, and Breast Pain (Pt here for 3 month f/u as well as back pain . Pt states breast pain has went away )    History of Present Illness    The patient presents for a routine follow-up visit and to discuss recent changes in her health status.    Main reports alterations in her menstrual cycle following recent weight loss. Her periods now begin with heavy flow and progressively lighten, differing from her previous pattern of light to medium to heavy flow. The duration remains consistent, occasionally shortening by one day.    The patient has intermittent upper back pain, adjusting her posture throughout the day and occasionally feeling a popping sensation upon correction. This discomfort has persisted since a previous injury, though it does not cause significant pain.    The patient's energy levels are adequate when she obtains sufficient sleep. She typically exercises at the gym about 5 days a week, though this has decreased recently due to starting a new job. She uses a pre-workout supplement without jitters.    The patient notes some lower back discomfort during the visit, which she attributes to her current sitting position.    The patient mentions previous diagnoses of prediabetes and PCOS, though the current status of these conditions is unclear.    Mounjaro (tirzepatide) for weight loss/PCOS management. Prediabetes.  PCOS (Polycystic Ovary Syndrome) LMP: Unknown.  Contraception: None.  G#P#: Unknown.  Children: Unknown.  Lactation History: Unknown.  HRT: Never.    Hemoglobin A1c: Previously done.  Blood count: Previously done.  Iron levels: Previously done.  Sodium: Previously done.  Potassium: Previously done.    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  Marital status: .    ROS:  General: -fever, -chills, -fatigue, -weight gain, +weight loss  Eyes:  -vision changes, -redness, -discharge  ENT: -ear pain, -nasal congestion, -sore throat  Cardiovascular: -chest pain, -palpitations, -lower extremity edema  Respiratory: -cough, -shortness of breath  Gastrointestinal: -abdominal pain, -nausea, -vomiting, -diarrhea, -constipation, -blood in stool  Genitourinary: -dysuria, -hematuria, -frequency  Musculoskeletal: -joint pain, -muscle pain, +back pain  Skin: -rash, -lesion  Neurological: -headache, -dizziness, -numbness, -tingling  Psychiatric: -anxiety, -depression, -sleep difficulty            Objective     Physical Exam  Vitals and nursing note reviewed.   Constitutional:       Appearance: Normal appearance.   HENT:      Head: Normocephalic and atraumatic.   Pulmonary:      Effort: Pulmonary effort is normal.   Neurological:      General: No focal deficit present.      Mental Status: She is alert and oriented to person, place, and time.   Psychiatric:         Mood and Affect: Mood normal.         Behavior: Behavior normal.         Physical Exam                 Assessment and Plan     1. Pre-diabetes  Comments:  stable, watch intake of refined carbs and sugars  Orders:  -     HEMOGLOBIN A1C; Future; Expected date: 08/09/2024    2. Iron deficiency anemia, unspecified iron deficiency anemia type  Comments:  stable monitor levels  Orders:  -     CBC W/ AUTO DIFFERENTIAL; Future; Expected date: 08/09/2024  -     IRON AND TIBC; Future; Expected date: 08/09/2024  -     FERRITIN; Future; Expected date: 08/09/2024  -     COMPREHENSIVE METABOLIC PANEL; Future; Expected date: 08/09/2024    3. Class 1 obesity due to excess calories without serious comorbidity with body mass index (BMI) of 34.0 to 34.9 in adult    4. Interscapular pain    5. Polycystic ovaries        Assessment & Plan    Assessed patient's overall health status, noting stable blood pressure and good energy levels  Considered checking labs to monitor patient's progress  Evaluated changes in patient's menstrual  cycle, attributing it to weight loss and hormonal regulation  Reviewed medication needs, considering alternative sources for Majaro if insurance coverage issues arise  PATIENT EDUCATION:   Explained the relationship between weight loss, hormone regulation, and changes in menstrual cycle   Discussed how fat tissue absorbs hormones and how weight loss can lead to the release of stored hormones, affecting menstrual patterns   Informed patient about the potential benefits of weight loss on PCOS management  MEDICATIONS:   Discussed potential refill of Majaro   Provided information on alternative pharmacy [facility] for Majaro if insurance does not cover it   Mentioned availability of generic options (semaglutide or tirzepatide) compounded with B12 as alternatives  ORDERS:   Complete blood count, iron levels, kinetic sodium, potassium, and hemoglobin A1C ordered  FOLLOW UP:   Follow up in 6 months for annual check-up   Antmitrahia to message before appointment when ready to get labs done prior to the visit   Contact the office if anything is needed before the next appointment              Follow up in about 6 months (around 2/9/2025) for Annual Exam.    This note was generated with the assistance of ambient listening technology. Verbal consent was obtained by the patient and accompanying visitor(s) for the recording of patient appointment to facilitate this note. I attest to having reviewed and edited the generated note for accuracy, though some syntax or spelling errors may persist. Please contact the author of this note for any clarification.

## 2024-11-13 NOTE — ANESTHESIA PREPROCEDURE EVALUATION
09/08/2023  Main Aguilar is a 28 y.o., female.    Past Medical History:   Diagnosis Date    Asthma     Nontoxic single thyroid nodule     PCOS (polycystic ovarian syndrome)     Prediabetes     Seborrheic dermatitis      No past surgical history on file.    Pre-op Assessment    I have reviewed the Patient Summary Reports.    I have reviewed the NPO Status.   I have reviewed the Medications.     Review of Systems  Anesthesia Hx:  No previous Anesthesia  Neg history of prior surgery. Denies Family Hx of Anesthesia complications.   Denies Personal Hx of Anesthesia complications.   Social:  Non-Smoker    Hematology/Oncology:         -- Anemia:   Cardiovascular:  Cardiovascular Normal     Pulmonary:   Asthma mild    Renal/:  Renal/ Normal     Hepatic/GI:  Hepatic/GI Normal    Neurological:  Neurology Normal    Endocrine:   Diabetes, type 2  Obesity / BMI > 30      Physical Exam  General: Alert and Oriented    Airway:  Mallampati: II   Mouth Opening: Normal  TM Distance: Normal  Tongue: Normal  Neck ROM: Normal ROM    Dental:  Intact    Chest/Lungs:  Clear to auscultation, Normal Respiratory Rate    Heart:  Rate: Normal  Rhythm: Regular Rhythm        Anesthesia Plan  Type of Anesthesia, risks & benefits discussed:    Anesthesia Type: Gen ETT, Gen Supraglottic Airway, Gen Natural Airway, MAC  Intra-op Monitoring Plan: Standard ASA Monitors  Post Op Pain Control Plan: multimodal analgesia  Induction:  IV  Informed Consent: Informed consent signed with the Patient and all parties understand the risks and agree with anesthesia plan.  All questions answered. Patient consented to blood products? No  ASA Score: 2  Day of Surgery Review of History & Physical: H&P Update referred to the surgeon/provider.    Ready For Surgery From Anesthesia Perspective.     .      
Yes

## 2025-01-27 ENCOUNTER — OFFICE VISIT (OUTPATIENT)
Dept: URGENT CARE | Facility: CLINIC | Age: 30
End: 2025-01-27
Payer: COMMERCIAL

## 2025-01-27 VITALS
TEMPERATURE: 99 F | HEART RATE: 86 BPM | HEIGHT: 61 IN | OXYGEN SATURATION: 98 % | BODY MASS INDEX: 34.36 KG/M2 | DIASTOLIC BLOOD PRESSURE: 56 MMHG | RESPIRATION RATE: 20 BRPM | WEIGHT: 182 LBS | SYSTOLIC BLOOD PRESSURE: 119 MMHG

## 2025-01-27 DIAGNOSIS — J01.90 ACUTE NON-RECURRENT SINUSITIS, UNSPECIFIED LOCATION: ICD-10-CM

## 2025-01-27 DIAGNOSIS — R52 GENERALIZED BODY ACHES: ICD-10-CM

## 2025-01-27 DIAGNOSIS — J34.89 SINUS PRESSURE: ICD-10-CM

## 2025-01-27 DIAGNOSIS — R51.9 SINUS HEADACHE: ICD-10-CM

## 2025-01-27 DIAGNOSIS — L25.9 CONTACT DERMATITIS, UNSPECIFIED CONTACT DERMATITIS TYPE, UNSPECIFIED TRIGGER: Primary | ICD-10-CM

## 2025-01-27 LAB
CTP QC/QA: YES
CTP QC/QA: YES
POC MOLECULAR INFLUENZA A AGN: NEGATIVE
POC MOLECULAR INFLUENZA B AGN: NEGATIVE
SARS-COV-2 AG RESP QL IA.RAPID: NEGATIVE

## 2025-01-27 PROCEDURE — 87502 INFLUENZA DNA AMP PROBE: CPT | Mod: QW,S$GLB,, | Performed by: PHYSICIAN ASSISTANT

## 2025-01-27 PROCEDURE — 99214 OFFICE O/P EST MOD 30 MIN: CPT | Mod: S$GLB,,, | Performed by: PHYSICIAN ASSISTANT

## 2025-01-27 PROCEDURE — 87811 SARS-COV-2 COVID19 W/OPTIC: CPT | Mod: QW,S$GLB,, | Performed by: PHYSICIAN ASSISTANT

## 2025-01-27 RX ORDER — PREDNISONE 20 MG/1
20 TABLET ORAL DAILY
Qty: 5 TABLET | Refills: 0 | Status: SHIPPED | OUTPATIENT
Start: 2025-01-27 | End: 2025-02-01

## 2025-01-27 NOTE — PROGRESS NOTES
"Subjective:      Patient ID: Main Aguilar is a 29 y.o. female.    Vitals:  height is 5' 1" (1.549 m) and weight is 82.6 kg (181 lb 15.8 oz). Her oral temperature is 98.6 °F (37 °C). Her blood pressure is 119/56 (abnormal) and her pulse is 86. Her respiration is 20 and oxygen saturation is 98%.     Chief Complaint: Rash    Patient reports with red, itchy bumps on her left hand, mid-stomach, and inner thighs starting on Friday after using new exfoliant body wash. Patient does have hx of eczema- states she used Clobetasol Propionate ointment with made rash on her hand less inflamed. No new medications, foods. No wooded areas or plant exposure. Patient also reports 2 days ago she began to have headaches and body aches. Patient also states she noticed at work today that she feels pressure in her eyes when she looks around and she feels light sensitive. Has had congestion on and off for about a month, mostly runny nose. Denies fever/chills, sore throat, n/v/d.  States that she had some relief with Sudafed and Tylenol.    Rash  This is a new problem. The current episode started in the past 7 days. The problem has been gradually worsening since onset. The affected locations include the left hand, left wrist, abdomen and left upper leg. The rash is characterized by itchiness, swelling, redness and burning. She was exposed to a new detergent/soap. Associated symptoms include eye pain and fatigue. Pertinent negatives include no anorexia, congestion, cough, diarrhea, facial edema, fever, joint pain, nail changes, rhinorrhea, shortness of breath, sore throat or vomiting. Past treatments include topical steroids. The treatment provided mild relief. Her past medical history is significant for asthma and eczema. There is no history of allergies or varicella.       Constitution: Positive for fatigue. Negative for chills, sweating and fever.   HENT:  Positive for ear pain and sinus pressure. Negative for ear discharge, " hearing loss, congestion, sinus pain and sore throat.    Neck: neck negative.   Cardiovascular: Negative.    Eyes:  Positive for eye pain. Negative for eye discharge, eye redness, vision loss, blurred vision and eyelid swelling.   Respiratory:  Negative for cough and shortness of breath.    Gastrointestinal:  Negative for nausea, vomiting and diarrhea.   Musculoskeletal:  Positive for muscle ache.   Skin:  Positive for rash.   Allergic/Immunologic: Positive for environmental allergies, eczema and itching.   Neurological:  Positive for headaches. Negative for dizziness, passing out and history of migraines.      Objective:     Physical Exam   Constitutional: She appears well-developed.  Non-toxic appearance. She does not appear ill. No distress.   HENT:   Head: Normocephalic and atraumatic.   Ears:   Right Ear: Tympanic membrane, external ear and ear canal normal.   Left Ear: Tympanic membrane, external ear and ear canal normal.   Nose: No rhinorrhea or purulent discharge. Right sinus exhibits frontal sinus tenderness. Right sinus exhibits no maxillary sinus tenderness. Left sinus exhibits frontal sinus tenderness. Left sinus exhibits no maxillary sinus tenderness.   Mouth/Throat: Uvula is midline, oropharynx is clear and moist and mucous membranes are normal. Mucous membranes are moist. Oropharynx is clear.   Eyes: Conjunctivae, EOM and lids are normal. Pupils are equal, round, and reactive to light. Right eye exhibits no discharge. Left eye exhibits no discharge. Extraocular movement intact   Neck: Neck supple.   Cardiovascular: Normal rate, regular rhythm and normal heart sounds.   Pulmonary/Chest: Effort normal and breath sounds normal.   Abdominal: Normal appearance.   Musculoskeletal: Normal range of motion.         General: Normal range of motion.   Neurological: no focal deficit. She is alert. She displays no weakness. Gait normal.   Skin: Skin is warm, dry, not diaphoretic, not pale, rash (umbilical area  (L>R), mild on extensor surface of left hand), not vesicular and papular. Capillary refill takes less than 2 seconds.        Psychiatric: Her behavior is normal.     Results for orders placed or performed in visit on 01/27/25   SARS Coronavirus 2 Antigen, POCT Manual Read    Collection Time: 01/27/25  4:37 PM   Result Value Ref Range    SARS Coronavirus 2 Antigen Negative Negative     Acceptable Yes    POCT Influenza A/B MOLECULAR    Collection Time: 01/27/25  4:37 PM   Result Value Ref Range    POC Molecular Influenza A Ag Negative Negative    POC Molecular Influenza B Ag Negative Negative     Acceptable Yes          Assessment:     1. Contact dermatitis, unspecified contact dermatitis type, unspecified trigger    2. Generalized body aches    3. Sinus pressure    4. Sinus headache    5. Acute non-recurrent sinusitis, unspecified location        Plan:     Continue Clobetasol Propionate ointment to rash. Discussed optional oral prednisone to help with itching as well as sinus pressure/congestion. Can add oral Benadryl if needed. Continue Sudafed and Tylenol prn. Monitor for fever. Close f/u if symptoms worsen or fail to improve with treatment.     Contact dermatitis, unspecified contact dermatitis type, unspecified trigger  -     predniSONE (DELTASONE) 20 MG tablet; Take 1 tablet (20 mg total) by mouth once daily. for 5 days  Dispense: 5 tablet; Refill: 0    Generalized body aches  -     SARS Coronavirus 2 Antigen, POCT Manual Read  -     POCT Influenza A/B MOLECULAR    Sinus pressure  -     SARS Coronavirus 2 Antigen, POCT Manual Read  -     POCT Influenza A/B MOLECULAR  -     predniSONE (DELTASONE) 20 MG tablet; Take 1 tablet (20 mg total) by mouth once daily. for 5 days  Dispense: 5 tablet; Refill: 0    Sinus headache  -     SARS Coronavirus 2 Antigen, POCT Manual Read  -     POCT Influenza A/B MOLECULAR    Acute non-recurrent sinusitis, unspecified location  -     predniSONE  (DELTASONE) 20 MG tablet; Take 1 tablet (20 mg total) by mouth once daily. for 5 days  Dispense: 5 tablet; Refill: 0

## 2025-03-28 ENCOUNTER — OFFICE VISIT (OUTPATIENT)
Dept: INTERNAL MEDICINE | Facility: CLINIC | Age: 30
End: 2025-03-28
Payer: COMMERCIAL

## 2025-03-28 ENCOUNTER — LAB VISIT (OUTPATIENT)
Dept: LAB | Facility: HOSPITAL | Age: 30
End: 2025-03-28
Attending: FAMILY MEDICINE
Payer: COMMERCIAL

## 2025-03-28 VITALS
HEIGHT: 62 IN | BODY MASS INDEX: 34.12 KG/M2 | TEMPERATURE: 97 F | OXYGEN SATURATION: 100 % | SYSTOLIC BLOOD PRESSURE: 110 MMHG | DIASTOLIC BLOOD PRESSURE: 74 MMHG | HEART RATE: 67 BPM | WEIGHT: 185.44 LBS

## 2025-03-28 DIAGNOSIS — N94.10 DYSPAREUNIA IN FEMALE: ICD-10-CM

## 2025-03-28 DIAGNOSIS — R73.03 PRE-DIABETES: ICD-10-CM

## 2025-03-28 DIAGNOSIS — M89.8X1 SHOULDER BLADE PAIN: ICD-10-CM

## 2025-03-28 DIAGNOSIS — K40.90 INGUINAL HERNIA OF RIGHT SIDE WITHOUT OBSTRUCTION OR GANGRENE: ICD-10-CM

## 2025-03-28 DIAGNOSIS — R10.30 LOWER ABDOMINAL PAIN: Primary | ICD-10-CM

## 2025-03-28 LAB
ALBUMIN SERPL BCP-MCNC: 3.9 G/DL (ref 3.5–5.2)
ALP SERPL-CCNC: 55 UNIT/L (ref 40–150)
ALT SERPL W/O P-5'-P-CCNC: 10 UNIT/L (ref 10–44)
ANION GAP (OHS): 9 MMOL/L (ref 8–16)
AST SERPL-CCNC: 18 UNIT/L (ref 11–45)
BILIRUB SERPL-MCNC: 0.9 MG/DL (ref 0.1–1)
BUN SERPL-MCNC: 11 MG/DL (ref 6–20)
CALCIUM SERPL-MCNC: 9 MG/DL (ref 8.7–10.5)
CHLORIDE SERPL-SCNC: 108 MMOL/L (ref 95–110)
CO2 SERPL-SCNC: 22 MMOL/L (ref 23–29)
CREAT SERPL-MCNC: 0.7 MG/DL (ref 0.5–1.4)
GFR SERPLBLD CREATININE-BSD FMLA CKD-EPI: >60 ML/MIN/1.73/M2
GLUCOSE SERPL-MCNC: 80 MG/DL (ref 70–110)
POTASSIUM SERPL-SCNC: 3.8 MMOL/L (ref 3.5–5.1)
PROT SERPL-MCNC: 7.7 GM/DL (ref 6–8.4)
SODIUM SERPL-SCNC: 139 MMOL/L (ref 136–145)

## 2025-03-28 PROCEDURE — 83036 HEMOGLOBIN GLYCOSYLATED A1C: CPT

## 2025-03-28 PROCEDURE — 3074F SYST BP LT 130 MM HG: CPT | Mod: CPTII,S$GLB,, | Performed by: FAMILY MEDICINE

## 2025-03-28 PROCEDURE — 80053 COMPREHEN METABOLIC PANEL: CPT

## 2025-03-28 PROCEDURE — 3008F BODY MASS INDEX DOCD: CPT | Mod: CPTII,S$GLB,, | Performed by: FAMILY MEDICINE

## 2025-03-28 PROCEDURE — 3044F HG A1C LEVEL LT 7.0%: CPT | Mod: CPTII,S$GLB,, | Performed by: FAMILY MEDICINE

## 2025-03-28 PROCEDURE — 1160F RVW MEDS BY RX/DR IN RCRD: CPT | Mod: CPTII,S$GLB,, | Performed by: FAMILY MEDICINE

## 2025-03-28 PROCEDURE — 99214 OFFICE O/P EST MOD 30 MIN: CPT | Mod: S$GLB,,, | Performed by: FAMILY MEDICINE

## 2025-03-28 PROCEDURE — 99999 PR PBB SHADOW E&M-EST. PATIENT-LVL V: CPT | Mod: PBBFAC,,, | Performed by: FAMILY MEDICINE

## 2025-03-28 PROCEDURE — 1159F MED LIST DOCD IN RCRD: CPT | Mod: CPTII,S$GLB,, | Performed by: FAMILY MEDICINE

## 2025-03-28 PROCEDURE — 3078F DIAST BP <80 MM HG: CPT | Mod: CPTII,S$GLB,, | Performed by: FAMILY MEDICINE

## 2025-03-28 PROCEDURE — 81001 URINALYSIS AUTO W/SCOPE: CPT | Mod: S$GLB,,, | Performed by: FAMILY MEDICINE

## 2025-03-28 PROCEDURE — 36415 COLL VENOUS BLD VENIPUNCTURE: CPT | Mod: PO

## 2025-03-29 LAB
EAG (OHS): 88 MG/DL (ref 68–131)
HBA1C MFR BLD: 4.7 % (ref 4–5.6)

## 2025-04-01 ENCOUNTER — HOSPITAL ENCOUNTER (OUTPATIENT)
Dept: RADIOLOGY | Facility: HOSPITAL | Age: 30
Discharge: HOME | End: 2025-04-01
Attending: FAMILY MEDICINE
Payer: COMMERCIAL

## 2025-04-01 DIAGNOSIS — K40.90 INGUINAL HERNIA OF RIGHT SIDE WITHOUT OBSTRUCTION OR GANGRENE: ICD-10-CM

## 2025-04-01 DIAGNOSIS — N94.10 DYSPAREUNIA IN FEMALE: ICD-10-CM

## 2025-04-01 LAB
BILIRUB SERPL-MCNC: ABNORMAL MG/DL
BLOOD URINE, POC: ABNORMAL
COLOR, POC UA: ABNORMAL
GLUCOSE UR QL STRIP: ABNORMAL
KETONES UR QL STRIP: 15
LEUKOCYTE ESTERASE URINE, POC: ABNORMAL
NITRITE, POC UA: ABNORMAL
PH, POC UA: 6
PROTEIN, POC: ABNORMAL
SPECIFIC GRAVITY, POC UA: 1.02
UROBILINOGEN, POC UA: 0.2

## 2025-04-01 PROCEDURE — 76830 TRANSVAGINAL US NON-OB: CPT | Mod: 26,,, | Performed by: RADIOLOGY

## 2025-04-01 PROCEDURE — 76882 US LMTD JT/FCL EVL NVASC XTR: CPT | Mod: 26,RT,, | Performed by: RADIOLOGY

## 2025-04-01 PROCEDURE — 76856 US EXAM PELVIC COMPLETE: CPT | Mod: TC,PO

## 2025-04-01 PROCEDURE — 76856 US EXAM PELVIC COMPLETE: CPT | Mod: 26,,, | Performed by: RADIOLOGY

## 2025-04-01 PROCEDURE — 76882 US LMTD JT/FCL EVL NVASC XTR: CPT | Mod: TC,PO,RT

## 2025-04-14 ENCOUNTER — RESULTS FOLLOW-UP (OUTPATIENT)
Dept: INTERNAL MEDICINE | Facility: CLINIC | Age: 30
End: 2025-04-14

## 2025-04-14 NOTE — PROGRESS NOTES
Subjective     Patient ID: Main Aguilar is a 29 y.o. female.    Chief Complaint: Abdominal Pain (Right side . Symptoms been going on for months and getting worse)    History of Present Illness    Main presents with abdominal pain, including a possible hernia in the groin area and sharp abdominal pain radiating to the back and chest.    HPI:  Main reports two distinct issues. She believes she may have a hernia in the groin area, noticeable when standing or stretched out. She describes an intermittent bulge in this area, which sometimes disappears, recalling an instance where she felt it retract. This symptom causes discomfort, particularly when wearing tight clothing or compression garments.    The second issue involves a sharp, simultaneous pain occurring for approximately 5 months. This pain is located in the lower abdomen and radiates to the back and upper chest. Main describes it as a quick, intense sensation that causes her to stop suddenly. She denies any specific injury causing this pain but mentions a possible muscle pull at the gym.    Main reports a previous back injury over a year ago while using rope machines at the gym. She felt tightness in her back later that day, and when she leaned into it, she felt a pop and subsequently had difficulty moving. This injury was located around the shoulder blade area.    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 She expresses interest in checking her blood sugar and A1C, given her history of low blood pressure and diabetes.    Main reports dyspareunia, which she associates with her PCOS (Polycystic Ovary Syndrome). Her last pelvic ultrasound was approximately 2-3 years ago.    Main denies any persistent pain or incarcerated hernia symptoms. She denies any issues with the possible hernia during bowel movements or urination.    MEDICATIONS:  Main is on Vitamin D,  2000 units daily. She has discontinued Mounjaro.    MEDICAL HISTORY:  Main has a history of PCOS, low blood pressure, and diabetes. Main's last Pap smear was 2-3 years ago.    FAMILY HISTORY:  Family history is significant for the patient's niece being pregnant.    IMAGING:  Main underwent a pelvic ultrasound 2-3 years ago and another one 2 months ago.    SOCIAL HISTORY:  Occupation: Teacher      ROS:  General: -fever, -chills, -fatigue, -weight gain, -weight loss  Eyes: -vision changes, -redness, -discharge  ENT: -ear pain, -nasal congestion, -sore throat  Cardiovascular: +chest pain, -palpitations, -lower extremity edema  Respiratory: -cough, -shortness of breath  Gastrointestinal: +abdominal pain, -nausea, -vomiting, -diarrhea, -constipation, -blood in stool, +hernias, +lumps/masses, +shooting pain sensation  Genitourinary: -dysuria, -hematuria, -frequency  Musculoskeletal: -joint pain, +muscle pain, +back pain  Skin: -rash, -lesion  Neurological: -headache, -dizziness, -numbness, -tingling  Psychiatric: -anxiety, -depression, -sleep difficulty  Female Genitourinary: +sexual difficulty, pain, or concern            Objective     Physical Exam  Vitals and nursing note reviewed.   Constitutional:       Appearance: Normal appearance. She is obese.   HENT:      Head: Normocephalic and atraumatic.   Eyes:      Extraocular Movements: Extraocular movements intact.      Conjunctiva/sclera: Conjunctivae normal.   Cardiovascular:      Rate and Rhythm: Normal rate and regular rhythm.      Pulses: Normal pulses.      Heart sounds: Normal heart sounds.   Pulmonary:      Effort: Pulmonary effort is normal.      Breath sounds: Normal breath sounds.   Abdominal:      General: Abdomen is flat. Bowel sounds are normal.      Palpations: Abdomen is soft.   Musculoskeletal:         General: Normal range of motion.      Cervical back: Normal range of motion and neck supple.   Skin:     General: Skin is warm and dry.    Neurological:      General: No focal deficit present.      Mental Status: She is alert and oriented to person, place, and time.   Psychiatric:         Mood and Affect: Mood normal.         Behavior: Behavior normal.                Assessment and Plan     1. Lower abdominal pain    2. Pre-diabetes  -     Hemoglobin A1C; Future; Expected date: 04/11/2025  -     Comprehensive Metabolic Panel; Future; Expected date: 06/28/2025    3. Inguinal hernia of right side without obstruction or gangrene  -     US Inguinal Hernia Right; Future; Expected date: 03/28/2025  -     POCT URINE DIPSTICK WITH MICROSCOPE, AUTOMATED    4. Dyspareunia in female  -     Cancel: US Pelvis Complete Non OB; Future; Expected date: 03/28/2025  -     US Pelvis Comp with Transvag NON-OB (xpd); Future; Expected date: 03/28/2025    5. Shoulder blade pain        Assessment & Plan    Assessed abdominal pain, considering potential hernia in right groin area and musculoskeletal causes for upper chest/back pain.  Determined chest/back pain likely musculoskeletal, possibly related to previous gym injury.    PATIENT EDUCATION:   Explained that hernias typically do not require surgery unless incarcerated or causing persistent issues.   Discussed potential use of compressive garments for hernia management, emphasizing proper application technique and recommending wearing before hernia protrusion to provide support during physical activity.   Emphasized the importance of posture and stretching for musculoskeletal pain management, particularly given previous back injury.    ACTION ITEMS/LIFESTYLE:   Antaneshia to continue stretching exercises, particularly important post-injury.    ORDERS:   Ordered US Right Groin to evaluate for hernia.   Ordered Pelvic US to assess PCOS and rule out other causes of pain.   Ordered A1C and glucose test to monitor diabetes management.   Ordered Urinalysis to rule out urinary tract issues.              Follow up if symptoms  worsen or fail to improve.    This note was generated with the assistance of ambient listening technology. Verbal consent was obtained by the patient and accompanying visitor(s) for the recording of patient appointment to facilitate this note. I attest to having reviewed and edited the generated note for accuracy, though some syntax or spelling errors may persist. Please contact the author of this note for any clarification.

## 2025-04-22 ENCOUNTER — LAB VISIT (OUTPATIENT)
Dept: LAB | Facility: HOSPITAL | Age: 30
End: 2025-04-22
Attending: NURSE PRACTITIONER
Payer: COMMERCIAL

## 2025-04-22 ENCOUNTER — RESULTS FOLLOW-UP (OUTPATIENT)
Dept: OBSTETRICS AND GYNECOLOGY | Facility: CLINIC | Age: 30
End: 2025-04-22

## 2025-04-22 ENCOUNTER — OFFICE VISIT (OUTPATIENT)
Dept: OBSTETRICS AND GYNECOLOGY | Facility: CLINIC | Age: 30
End: 2025-04-22
Payer: COMMERCIAL

## 2025-04-22 VITALS
HEIGHT: 62 IN | DIASTOLIC BLOOD PRESSURE: 55 MMHG | WEIGHT: 185.44 LBS | SYSTOLIC BLOOD PRESSURE: 110 MMHG | BODY MASS INDEX: 34.12 KG/M2

## 2025-04-22 DIAGNOSIS — E28.2 PCOS (POLYCYSTIC OVARIAN SYNDROME): Primary | ICD-10-CM

## 2025-04-22 DIAGNOSIS — D21.9 FIBROIDS: ICD-10-CM

## 2025-04-22 DIAGNOSIS — E28.2 PCOS (POLYCYSTIC OVARIAN SYNDROME): ICD-10-CM

## 2025-04-22 DIAGNOSIS — R93.89 THICKENED ENDOMETRIUM: ICD-10-CM

## 2025-04-22 LAB
DHEA-S SERPL-MCNC: 156.4 UG/DL (ref 95.8–511.7)
FSH SERPL-ACNC: 1.84 MIU/ML
LH SERPL-ACNC: 2.1 MIU/ML
TESTOST SERPL-MCNC: 40 NG/DL (ref 5–73)
TSH SERPL-ACNC: 1.39 UIU/ML (ref 0.4–4)

## 2025-04-22 PROCEDURE — 3078F DIAST BP <80 MM HG: CPT | Mod: CPTII,S$GLB,, | Performed by: NURSE PRACTITIONER

## 2025-04-22 PROCEDURE — 99999 PR PBB SHADOW E&M-EST. PATIENT-LVL III: CPT | Mod: PBBFAC,,, | Performed by: NURSE PRACTITIONER

## 2025-04-22 PROCEDURE — 3008F BODY MASS INDEX DOCD: CPT | Mod: CPTII,S$GLB,, | Performed by: NURSE PRACTITIONER

## 2025-04-22 PROCEDURE — 83002 ASSAY OF GONADOTROPIN (LH): CPT

## 2025-04-22 PROCEDURE — 83001 ASSAY OF GONADOTROPIN (FSH): CPT

## 2025-04-22 PROCEDURE — 1159F MED LIST DOCD IN RCRD: CPT | Mod: CPTII,S$GLB,, | Performed by: NURSE PRACTITIONER

## 2025-04-22 PROCEDURE — 1160F RVW MEDS BY RX/DR IN RCRD: CPT | Mod: CPTII,S$GLB,, | Performed by: NURSE PRACTITIONER

## 2025-04-22 PROCEDURE — 3044F HG A1C LEVEL LT 7.0%: CPT | Mod: CPTII,S$GLB,, | Performed by: NURSE PRACTITIONER

## 2025-04-22 PROCEDURE — 84443 ASSAY THYROID STIM HORMONE: CPT

## 2025-04-22 PROCEDURE — 84403 ASSAY OF TOTAL TESTOSTERONE: CPT

## 2025-04-22 PROCEDURE — 36415 COLL VENOUS BLD VENIPUNCTURE: CPT | Mod: PO

## 2025-04-22 PROCEDURE — 82627 DEHYDROEPIANDROSTERONE: CPT

## 2025-04-22 PROCEDURE — 99203 OFFICE O/P NEW LOW 30 MIN: CPT | Mod: S$GLB,,, | Performed by: NURSE PRACTITIONER

## 2025-04-22 PROCEDURE — 3074F SYST BP LT 130 MM HG: CPT | Mod: CPTII,S$GLB,, | Performed by: NURSE PRACTITIONER

## 2025-04-22 NOTE — PROGRESS NOTES
Subjective:       Patient ID: Main Aguilar is a 29 y.o. female.    Chief Complaint:  Fibroids      History of Present Illness  HPI  G0 present for f/u  U/s done with PCP  BM this morning then had pain for about 30 minutes after  Had pain yesterday in the gym  Pain has been present over a year  If she tenses or works out or intercourse  Position changes do help  Hx of constipation  BM 3x/wk; hard    Monthly menses x6-7d  Clots the whole time -- dime sized or less  Pads-overnight changing TID (needs to)  No flooding   Bad cramping -- tylenol with relief  Spotting randomly throughout the month  They desire pregnancy in the next year or two  Requesting PCOS labs today -- dx with labs and u/s in the past    FINDINGS:     Uterus is retroverted flexed measuring 8.9 x 5.6 x 7.6 cm.  Multiple fibroids identified.  Largest measures 3.0 cm in maximal dimension.  Endometrium is markedly thickened measuring 27 mm in maximal thickness as with mild internal heterogeneity.  This may be related to patient's menstrual state.  Close follow-up recommended.     Right ovary measures 4.1 x 2.6 x 2.4 cm with normal-appearing follicles.  Normal Doppler flow.     Left ovary measures 5.7 x 2.2 x 2.5 cm and is normal in appearance.  Normal Doppler flow.     No pelvic free fluid.       GYN & OB History  Patient's last menstrual period was 2025 (exact date).   Date of Last Pap: No result found    OB History    Para Term  AB Living   0 0 0 0 0 0   SAB IAB Ectopic Multiple Live Births   0 0 0 0 0       Review of Systems  Review of Systems   Gastrointestinal:  Positive for constipation.   Genitourinary:  Positive for dysmenorrhea, dyspareunia, menstrual problem and pelvic pain. Negative for menorrhagia.           Objective:      Physical Exam:   Constitutional: She is oriented to person, place, and time. She appears well-developed and well-nourished. No distress.    HENT:   Head: Normocephalic and atraumatic.    Eyes:  Pupils are equal, round, and reactive to light. Conjunctivae and EOM are normal.      Pulmonary/Chest: Effort normal.                  Musculoskeletal: Normal range of motion and moves all extremeties.       Neurological: She is alert and oriented to person, place, and time.    Skin: Skin is warm and dry. No rash noted. She is not diaphoretic. No erythema. No pallor.    Psychiatric: She has a normal mood and affect. Her behavior is normal. Judgment and thought content normal.             Assessment:        1. PCOS (polycystic ovarian syndrome)    2. Fibroids    3. Thickened endometrium               Plan:   reviewed ultrasound in detail including management -- OCPs, nuvaring, lysteda, IUDs, UAE, myomectomy  Undecided  Continue with diet and exercise    Labs today  Rpt u/s in about 5-6 weeks after menses to check endometrium    Also reviewed PCOS and endometriosis with her in detail  Constipation comfort measures discussed -- increasing fiber, water intake, exercise, OTC stool softeners/fiber supplements    Continue annual well woman exam.    PCOS (polycystic ovarian syndrome)  -     Follicle Stimulating Hormone; Future; Expected date: 04/22/2025  -     Luteinizing Hormone; Future; Expected date: 04/29/2025  -     Testosterone; Future; Expected date: 04/22/2025  -     DHEA-Sulfate; Future; Expected date: 04/22/2025  -     TSH; Future; Expected date: 04/22/2025    Fibroids    Thickened endometrium  -     US Pelvis Comp with Transvag NON-OB (xpd; Future; Expected date: 06/22/2025

## 2025-06-03 ENCOUNTER — HOSPITAL ENCOUNTER (OUTPATIENT)
Dept: RADIOLOGY | Facility: HOSPITAL | Age: 30
Discharge: HOME | End: 2025-06-03
Attending: NURSE PRACTITIONER
Payer: COMMERCIAL

## 2025-06-03 DIAGNOSIS — R93.89 THICKENED ENDOMETRIUM: ICD-10-CM

## 2025-06-03 PROCEDURE — 76856 US EXAM PELVIC COMPLETE: CPT | Mod: 26,,, | Performed by: RADIOLOGY

## 2025-06-03 PROCEDURE — 76830 TRANSVAGINAL US NON-OB: CPT | Mod: 26,,, | Performed by: RADIOLOGY

## 2025-06-03 PROCEDURE — 76830 TRANSVAGINAL US NON-OB: CPT | Mod: TC,PO

## (undated) DEVICE — GLOVE SURGICAL LATEX SZ 7

## (undated) DEVICE — DRAPE U SPLIT SHEET 54X76IN

## (undated) DEVICE — UNDERGLOVES BIOGEL PI SIZE 7.5

## (undated) DEVICE — PACK BASIC SETUP SC BR

## (undated) DEVICE — ALCOHOL 70% ISOP RUBBING 4OZ

## (undated) DEVICE — POSITIONER HEAD DONUT 9IN FOAM

## (undated) DEVICE — PAD CAST SPECIALIST STRL 3

## (undated) DEVICE — GOWN NONREINF SET-IN SLV 2XL

## (undated) DEVICE — DRAPE HAND STERILE

## (undated) DEVICE — COVER CAMERA OPERATING ROOM

## (undated) DEVICE — TOWEL OR DISP STRL BLUE 4/PK

## (undated) DEVICE — TOURNIQUET SB QC DP 18X4IN

## (undated) DEVICE — DRESSING XEROFORM FOIL PK 1X8

## (undated) DEVICE — SCRUB DYNA-HEX LIQ 4% CHG 4OZ

## (undated) DEVICE — SUPPORT ULNA NERVE PROTECTOR

## (undated) DEVICE — PAD ABD 8X10 STERILE

## (undated) DEVICE — GLOVE BIOGEL SZ 8 1/2

## (undated) DEVICE — UNDERGLOVES BIOGEL PI SZ 7 LF

## (undated) DEVICE — SOL IRR SOD CHL .9% POUR

## (undated) DEVICE — DRAPE THREE-QTR REINF 53X77IN

## (undated) DEVICE — SPONGE COTTON TRAY 4X4IN

## (undated) DEVICE — APPLICATOR CHLORAPREP ORN 26ML

## (undated) DEVICE — NDL SAFETY 25G X 1.5 ECLIPSE

## (undated) DEVICE — COVER LIGHT HANDLE 80/CA

## (undated) DEVICE — BANDAGE ESMARK ELASTIC ST 4X9

## (undated) DEVICE — UNDERGLOVES BIOGEL PI SIZE 8.5

## (undated) DEVICE — SUT 4-0 ETHILON 18 PS-2

## (undated) DEVICE — SYR 10CC LUER LOCK

## (undated) DEVICE — GOWN POLY REINF BRTH SLV XL